# Patient Record
Sex: FEMALE | Race: WHITE | NOT HISPANIC OR LATINO | Employment: UNEMPLOYED | URBAN - METROPOLITAN AREA
[De-identification: names, ages, dates, MRNs, and addresses within clinical notes are randomized per-mention and may not be internally consistent; named-entity substitution may affect disease eponyms.]

---

## 2019-12-13 ENCOUNTER — CLINICAL SUPPORT (OUTPATIENT)
Dept: CARDIAC REHAB | Facility: HOSPITAL | Age: 44
End: 2019-12-13
Attending: SPECIALIST
Payer: COMMERCIAL

## 2019-12-13 DIAGNOSIS — Z95.5 S/P CORONARY ARTERY STENT PLACEMENT: ICD-10-CM

## 2019-12-13 PROCEDURE — 93798 PHYS/QHP OP CAR RHAB W/ECG: CPT

## 2019-12-16 ENCOUNTER — CLINICAL SUPPORT (OUTPATIENT)
Dept: CARDIAC REHAB | Facility: HOSPITAL | Age: 44
End: 2019-12-16
Attending: SPECIALIST
Payer: COMMERCIAL

## 2019-12-16 DIAGNOSIS — Z95.5 S/P CORONARY ARTERY STENT PLACEMENT: ICD-10-CM

## 2019-12-16 PROCEDURE — 93798 PHYS/QHP OP CAR RHAB W/ECG: CPT

## 2019-12-18 ENCOUNTER — CLINICAL SUPPORT (OUTPATIENT)
Dept: CARDIAC REHAB | Facility: HOSPITAL | Age: 44
End: 2019-12-18
Attending: SPECIALIST
Payer: COMMERCIAL

## 2019-12-18 DIAGNOSIS — Z95.5 S/P CORONARY ARTERY STENT PLACEMENT: ICD-10-CM

## 2019-12-18 PROCEDURE — 93798 PHYS/QHP OP CAR RHAB W/ECG: CPT

## 2019-12-20 ENCOUNTER — CLINICAL SUPPORT (OUTPATIENT)
Dept: CARDIAC REHAB | Facility: HOSPITAL | Age: 44
End: 2019-12-20
Attending: SPECIALIST
Payer: COMMERCIAL

## 2019-12-20 DIAGNOSIS — Z95.5 S/P CORONARY ARTERY STENT PLACEMENT: ICD-10-CM

## 2019-12-20 PROCEDURE — 93798 PHYS/QHP OP CAR RHAB W/ECG: CPT

## 2019-12-30 ENCOUNTER — CLINICAL SUPPORT (OUTPATIENT)
Dept: CARDIAC REHAB | Facility: HOSPITAL | Age: 44
End: 2019-12-30
Attending: SPECIALIST
Payer: COMMERCIAL

## 2019-12-30 DIAGNOSIS — Z95.5 S/P CORONARY ARTERY STENT PLACEMENT: ICD-10-CM

## 2019-12-30 PROCEDURE — 93798 PHYS/QHP OP CAR RHAB W/ECG: CPT

## 2020-10-14 NOTE — PROGRESS NOTES
[facility] Note    PCP: Primary Doctor No    Chief Complaint: urinary incontinence     SUBJECTIVE:       History of Present Illness:  Julieth Lozano is a 45 y.o. female who presents to clinic for urinary incontinence. She is New  to our clinic.     Complaining of severe urinary incontinence. This is associated with urges as well as activity. States she will sit on the toilet for a while to ensure everything comes out however will then have to void shortly after or leaks immediately after. Wears 7 pads per day.   No hematuria or dysuria.   3 vaginal deliveries. Has constipation, bowel movement 3 times a week.   Drinks mostly water and coke.   No UTIs or hx of stones.     UA today: negative for blood, leuks, nitrite   PVR today: 30 cc    Last urine culture: no documented UTIs    Family  hx: no  malignancy   CAD s/p stent placement of Nov 2019, HTN, HLD    Past medical, family, and social history reviewed as documented in chart with pertinent positive medical, family, and social history detailed in HPI.    Review of patient's allergies indicates:   Allergen Reactions    Effexor [venlafaxine]        Past Medical History:   Diagnosis Date    Acid reflux     Coronary artery disease     Hyperlipemia     Hypertension      Past Surgical History:   Procedure Laterality Date    CERVICAL DISC SURGERY      CHOLECYSTECTOMY      CORONARY ANGIOPLASTY WITH STENT PLACEMENT      DILATION AND CURETTAGE OF UTERUS      THORACIC DISC SURGERY       No family history on file.  Social History     Tobacco Use    Smoking status: Former Smoker   Substance Use Topics    Alcohol use: Not on file    Drug use: Not on file        Review of Systems   Constitutional: Negative for chills and fever.   HENT: Negative for trouble swallowing.    Eyes: Negative for pain.   Respiratory: Negative for cough and shortness of breath.    Gastrointestinal: Positive for constipation. Negative for diarrhea, nausea and vomiting.  "  Genitourinary: Positive for frequency and urgency. Negative for difficulty urinating, hematuria and vaginal pain.   Musculoskeletal: Negative for back pain.   Skin: Negative for rash.   Neurological: Negative for weakness.   Psychiatric/Behavioral: Negative for behavioral problems.       OBJECTIVE:     Anticoagulation:  Aspirin and Plavix    Estimated body mass index is 35.16 kg/m² as calculated from the following:    Height as of this encounter: 5' 9" (1.753 m).    Weight as of this encounter: 108 kg (238 lb 1.6 oz).    Vital Signs (Most Recent)  Pulse: 63 (10/15/20 1011)  BP: 134/81 (10/15/20 1011)    Physical Exam   Vitals reviewed.  Constitutional: She is oriented to person, place, and time. She does not appear ill. No distress.   HENT:   Head: Normocephalic and atraumatic.   Eyes: No scleral icterus.   Cardiovascular: Normal rate and regular rhythm.    Pulmonary/Chest: Effort normal. No respiratory distress.   Abdominal: Soft. Normal appearance.   Genitourinary:    Pelvic exam was performed with patient in the lithotomy position.   There is no rash on the right labia. There is no rash on the left labia.    Genitourinary Comments: No significant vaginal prolapse   No OFELIA on exam     Neurological: She is alert and oriented to person, place, and time.   Psychiatric: Her behavior is normal. Mood normal.       Imaging:  No pertinent recent imaging available.    ASSESSMENT     1. Urinary incontinence, unspecified type        PLAN:     - We discussed that her symptoms have components of both stress and urge incontinence and we need to get a better idea of which is more prominent and bothersome  - 3 day voiding diary given  - I recommended that the most important modifiable factor is getting her constipation under control and having at least 1 bowel movement a day  - She recently had a cardiac stent placed and is on Plavix, seeing her cardiologist next month for this  - For this reason we do not want to proceed with " anything invasive at this time  - I have given her a trial of Myrbetriq 25 mg to start after she completes the voiding diary. Risks and benefits discussed and informed her the medication can take up to 6 weeks to work fully  - Will see her back in 3 months. Depending on her symptoms and the status of her cardiac issues we can consider further workup including UDS    Nimo Silvestre MD     Letter to Karthikeyan Biswas MD

## 2020-10-15 ENCOUNTER — OFFICE VISIT (OUTPATIENT)
Dept: UROLOGY | Facility: CLINIC | Age: 45
End: 2020-10-15
Payer: COMMERCIAL

## 2020-10-15 VITALS
HEIGHT: 69 IN | BODY MASS INDEX: 35.27 KG/M2 | WEIGHT: 238.13 LBS | SYSTOLIC BLOOD PRESSURE: 134 MMHG | HEART RATE: 63 BPM | DIASTOLIC BLOOD PRESSURE: 81 MMHG

## 2020-10-15 DIAGNOSIS — R32 URINARY INCONTINENCE, UNSPECIFIED TYPE: Primary | ICD-10-CM

## 2020-10-15 LAB
BILIRUB SERPL-MCNC: NORMAL MG/DL
BLOOD URINE, POC: NORMAL
CLARITY, POC UA: CLEAR
COLOR, POC UA: YELLOW
GLUCOSE UR QL STRIP: NORMAL
KETONES UR QL STRIP: NORMAL
LEUKOCYTE ESTERASE URINE, POC: NORMAL
NITRITE, POC UA: NORMAL
PH, POC UA: 6
PROTEIN, POC: NORMAL
SPECIFIC GRAVITY, POC UA: 1.02
UROBILINOGEN, POC UA: 0.2

## 2020-10-15 PROCEDURE — 99999 PR PBB SHADOW E&M-NEW PATIENT-LVL III: ICD-10-PCS | Mod: PBBFAC,,, | Performed by: STUDENT IN AN ORGANIZED HEALTH CARE EDUCATION/TRAINING PROGRAM

## 2020-10-15 PROCEDURE — 81002 POCT URINE DIPSTICK WITHOUT MICROSCOPE: ICD-10-PCS | Mod: S$GLB,,, | Performed by: STUDENT IN AN ORGANIZED HEALTH CARE EDUCATION/TRAINING PROGRAM

## 2020-10-15 PROCEDURE — 51701 PR INSERTION OF NON-INDWELLING BLADDER CATHETERIZATION FOR RESIDUAL UR: ICD-10-PCS | Mod: S$GLB,,, | Performed by: STUDENT IN AN ORGANIZED HEALTH CARE EDUCATION/TRAINING PROGRAM

## 2020-10-15 PROCEDURE — 3008F PR BODY MASS INDEX (BMI) DOCUMENTED: ICD-10-PCS | Mod: CPTII,S$GLB,, | Performed by: STUDENT IN AN ORGANIZED HEALTH CARE EDUCATION/TRAINING PROGRAM

## 2020-10-15 PROCEDURE — 51701 INSERT BLADDER CATHETER: CPT | Mod: S$GLB,,, | Performed by: STUDENT IN AN ORGANIZED HEALTH CARE EDUCATION/TRAINING PROGRAM

## 2020-10-15 PROCEDURE — 99204 PR OFFICE/OUTPT VISIT, NEW, LEVL IV, 45-59 MIN: ICD-10-PCS | Mod: 25,S$GLB,, | Performed by: STUDENT IN AN ORGANIZED HEALTH CARE EDUCATION/TRAINING PROGRAM

## 2020-10-15 PROCEDURE — 99204 OFFICE O/P NEW MOD 45 MIN: CPT | Mod: 25,S$GLB,, | Performed by: STUDENT IN AN ORGANIZED HEALTH CARE EDUCATION/TRAINING PROGRAM

## 2020-10-15 PROCEDURE — 81002 URINALYSIS NONAUTO W/O SCOPE: CPT | Mod: S$GLB,,, | Performed by: STUDENT IN AN ORGANIZED HEALTH CARE EDUCATION/TRAINING PROGRAM

## 2020-10-15 PROCEDURE — 99999 PR PBB SHADOW E&M-NEW PATIENT-LVL III: CPT | Mod: PBBFAC,,, | Performed by: STUDENT IN AN ORGANIZED HEALTH CARE EDUCATION/TRAINING PROGRAM

## 2020-10-15 PROCEDURE — 3008F BODY MASS INDEX DOCD: CPT | Mod: CPTII,S$GLB,, | Performed by: STUDENT IN AN ORGANIZED HEALTH CARE EDUCATION/TRAINING PROGRAM

## 2020-10-15 RX ORDER — IRBESARTAN 150 MG/1
TABLET ORAL
COMMUNITY
Start: 2020-10-06

## 2020-10-15 RX ORDER — PANTOPRAZOLE SODIUM 40 MG/1
TABLET, DELAYED RELEASE ORAL
COMMUNITY
Start: 2020-10-06

## 2020-10-15 RX ORDER — MECLIZINE HCL 12.5 MG 12.5 MG/1
TABLET ORAL
COMMUNITY
Start: 2020-10-13

## 2020-10-15 RX ORDER — ASPIRIN 81 MG/1
81 TABLET ORAL DAILY
COMMUNITY

## 2020-10-15 RX ORDER — CLOPIDOGREL BISULFATE 75 MG/1
TABLET ORAL
COMMUNITY
Start: 2020-09-22

## 2020-10-15 RX ORDER — CITALOPRAM 20 MG/1
TABLET, FILM COATED ORAL
COMMUNITY
Start: 2020-10-13 | End: 2024-01-17 | Stop reason: CLARIF

## 2020-10-15 RX ORDER — MIRABEGRON 25 MG/1
25 TABLET, FILM COATED, EXTENDED RELEASE ORAL DAILY
Qty: 30 TABLET | Refills: 11 | Status: SHIPPED | OUTPATIENT
Start: 2020-10-15 | End: 2024-01-17 | Stop reason: CLARIF

## 2020-10-15 RX ORDER — PRAVASTATIN SODIUM 80 MG/1
80 TABLET ORAL DAILY
COMMUNITY
Start: 2020-10-06 | End: 2024-01-17 | Stop reason: CLARIF

## 2020-11-04 DIAGNOSIS — G47.33 OSA (OBSTRUCTIVE SLEEP APNEA): ICD-10-CM

## 2020-11-04 DIAGNOSIS — G47.9 FATIGUE DUE TO SLEEP PATTERN DISTURBANCE: ICD-10-CM

## 2020-11-04 DIAGNOSIS — R06.83 SNORING: ICD-10-CM

## 2020-11-04 DIAGNOSIS — R53.83 FATIGUE DUE TO SLEEP PATTERN DISTURBANCE: ICD-10-CM

## 2020-11-04 DIAGNOSIS — Z01.818 OTHER SPECIFIED PRE-OPERATIVE EXAMINATION: Primary | ICD-10-CM

## 2020-11-04 DIAGNOSIS — G47.19 EXCESSIVE DAYTIME SLEEPINESS: ICD-10-CM

## 2020-11-09 ENCOUNTER — HOSPITAL ENCOUNTER (OUTPATIENT)
Dept: PREADMISSION TESTING | Facility: HOSPITAL | Age: 45
Discharge: HOME | End: 2020-11-09
Attending: INTERNAL MEDICINE
Payer: COMMERCIAL

## 2020-11-09 DIAGNOSIS — Z01.818 OTHER SPECIFIED PRE-OPERATIVE EXAMINATION: ICD-10-CM

## 2020-11-09 LAB — SARS-COV-2 RNA RESP QL NAA+PROBE: NOT DETECTED

## 2020-11-09 PROCEDURE — U0003 INFECTIOUS AGENT DETECTION BY NUCLEIC ACID (DNA OR RNA); SEVERE ACUTE RESPIRATORY SYNDROME CORONAVIRUS 2 (SARS-COV-2) (CORONAVIRUS DISEASE [COVID-19]), AMPLIFIED PROBE TECHNIQUE, MAKING USE OF HIGH THROUGHPUT TECHNOLOGIES AS DESCRIBED BY CMS-2020-01-R: HCPCS

## 2020-11-12 ENCOUNTER — PROCEDURE VISIT (OUTPATIENT)
Dept: SLEEP MEDICINE | Facility: HOSPITAL | Age: 45
End: 2020-11-12
Attending: INTERNAL MEDICINE
Payer: COMMERCIAL

## 2020-11-12 DIAGNOSIS — G47.19 EXCESSIVE DAYTIME SLEEPINESS: ICD-10-CM

## 2020-11-12 DIAGNOSIS — R06.83 SNORING: ICD-10-CM

## 2020-11-12 DIAGNOSIS — G47.33 OSA (OBSTRUCTIVE SLEEP APNEA): ICD-10-CM

## 2020-11-12 DIAGNOSIS — G47.9 FATIGUE DUE TO SLEEP PATTERN DISTURBANCE: ICD-10-CM

## 2020-11-12 DIAGNOSIS — R53.83 FATIGUE DUE TO SLEEP PATTERN DISTURBANCE: ICD-10-CM

## 2020-11-12 PROCEDURE — 95810 POLYSOM 6/> YRS 4/> PARAM: CPT

## 2020-11-18 DIAGNOSIS — G47.33 OSA (OBSTRUCTIVE SLEEP APNEA): ICD-10-CM

## 2020-11-18 DIAGNOSIS — G47.19 EXCESSIVE DAYTIME SLEEPINESS: ICD-10-CM

## 2020-11-18 DIAGNOSIS — R06.83 SNORING: ICD-10-CM

## 2020-11-18 DIAGNOSIS — Z01.818 OTHER SPECIFIED PRE-OPERATIVE EXAMINATION: Primary | ICD-10-CM

## 2020-11-18 DIAGNOSIS — G47.9 FATIGUE DUE TO SLEEP PATTERN DISTURBANCE: ICD-10-CM

## 2020-11-18 DIAGNOSIS — R53.83 FATIGUE DUE TO SLEEP PATTERN DISTURBANCE: ICD-10-CM

## 2020-11-21 ENCOUNTER — LAB VISIT (OUTPATIENT)
Dept: PRIMARY CARE CLINIC | Facility: CLINIC | Age: 45
End: 2020-11-21
Payer: COMMERCIAL

## 2020-11-21 DIAGNOSIS — Z01.818 OTHER SPECIFIED PRE-OPERATIVE EXAMINATION: ICD-10-CM

## 2020-11-21 PROCEDURE — U0003 INFECTIOUS AGENT DETECTION BY NUCLEIC ACID (DNA OR RNA); SEVERE ACUTE RESPIRATORY SYNDROME CORONAVIRUS 2 (SARS-COV-2) (CORONAVIRUS DISEASE [COVID-19]), AMPLIFIED PROBE TECHNIQUE, MAKING USE OF HIGH THROUGHPUT TECHNOLOGIES AS DESCRIBED BY CMS-2020-01-R: HCPCS

## 2020-11-22 LAB — SARS-COV-2 RNA RESP QL NAA+PROBE: NOT DETECTED

## 2020-11-24 ENCOUNTER — PROCEDURE VISIT (OUTPATIENT)
Dept: SLEEP MEDICINE | Facility: HOSPITAL | Age: 45
End: 2020-11-24
Attending: INTERNAL MEDICINE
Payer: COMMERCIAL

## 2020-11-24 DIAGNOSIS — G47.9 FATIGUE DUE TO SLEEP PATTERN DISTURBANCE: ICD-10-CM

## 2020-11-24 DIAGNOSIS — R53.83 FATIGUE DUE TO SLEEP PATTERN DISTURBANCE: ICD-10-CM

## 2020-11-24 DIAGNOSIS — R06.83 SNORING: ICD-10-CM

## 2020-11-24 DIAGNOSIS — G47.19 EXCESSIVE DAYTIME SLEEPINESS: ICD-10-CM

## 2020-11-24 DIAGNOSIS — G47.33 OSA (OBSTRUCTIVE SLEEP APNEA): ICD-10-CM

## 2020-11-24 PROCEDURE — 95811 POLYSOM 6/>YRS CPAP 4/> PARM: CPT

## 2023-01-20 ENCOUNTER — OFFICE VISIT (OUTPATIENT)
Dept: ORTHOPEDICS | Facility: CLINIC | Age: 48
End: 2023-01-20
Payer: COMMERCIAL

## 2023-01-20 VITALS — BODY MASS INDEX: 35.25 KG/M2 | HEIGHT: 69 IN | WEIGHT: 238 LBS

## 2023-01-20 DIAGNOSIS — M17.11 PRIMARY OSTEOARTHRITIS OF RIGHT KNEE: Primary | ICD-10-CM

## 2023-01-20 DIAGNOSIS — M22.41 CHONDROMALACIA, PATELLA, RIGHT: ICD-10-CM

## 2023-01-20 PROCEDURE — 99203 OFFICE O/P NEW LOW 30 MIN: CPT | Mod: 25,S$GLB,, | Performed by: PHYSICIAN ASSISTANT

## 2023-01-20 PROCEDURE — 1159F MED LIST DOCD IN RCRD: CPT | Mod: CPTII,S$GLB,, | Performed by: PHYSICIAN ASSISTANT

## 2023-01-20 PROCEDURE — 3008F BODY MASS INDEX DOCD: CPT | Mod: CPTII,S$GLB,, | Performed by: PHYSICIAN ASSISTANT

## 2023-01-20 PROCEDURE — 1160F RVW MEDS BY RX/DR IN RCRD: CPT | Mod: CPTII,S$GLB,, | Performed by: PHYSICIAN ASSISTANT

## 2023-01-20 PROCEDURE — 1159F PR MEDICATION LIST DOCUMENTED IN MEDICAL RECORD: ICD-10-PCS | Mod: CPTII,S$GLB,, | Performed by: PHYSICIAN ASSISTANT

## 2023-01-20 PROCEDURE — 1160F PR REVIEW ALL MEDS BY PRESCRIBER/CLIN PHARMACIST DOCUMENTED: ICD-10-PCS | Mod: CPTII,S$GLB,, | Performed by: PHYSICIAN ASSISTANT

## 2023-01-20 PROCEDURE — 20610 LARGE JOINT ASPIRATION/INJECTION: R KNEE: ICD-10-PCS | Mod: RT,S$GLB,, | Performed by: PHYSICIAN ASSISTANT

## 2023-01-20 PROCEDURE — 99203 PR OFFICE/OUTPT VISIT, NEW, LEVL III, 30-44 MIN: ICD-10-PCS | Mod: 25,S$GLB,, | Performed by: PHYSICIAN ASSISTANT

## 2023-01-20 PROCEDURE — 3008F PR BODY MASS INDEX (BMI) DOCUMENTED: ICD-10-PCS | Mod: CPTII,S$GLB,, | Performed by: PHYSICIAN ASSISTANT

## 2023-01-20 PROCEDURE — 20610 DRAIN/INJ JOINT/BURSA W/O US: CPT | Mod: RT,S$GLB,, | Performed by: PHYSICIAN ASSISTANT

## 2023-01-20 RX ORDER — MELOXICAM 15 MG/1
TABLET ORAL
COMMUNITY
End: 2024-01-17 | Stop reason: CLARIF

## 2023-01-20 RX ORDER — TIRZEPATIDE 2.5 MG/.5ML
INJECTION, SOLUTION SUBCUTANEOUS
COMMUNITY
Start: 2023-01-06 | End: 2024-01-17

## 2023-01-20 RX ORDER — SERTRALINE HYDROCHLORIDE 100 MG/1
100 TABLET, FILM COATED ORAL EVERY MORNING
COMMUNITY
Start: 2022-11-03

## 2023-01-20 RX ORDER — ROSUVASTATIN CALCIUM 10 MG/1
10 TABLET, COATED ORAL EVERY MORNING
COMMUNITY
Start: 2022-12-12

## 2023-01-20 RX ORDER — TRIAMCINOLONE ACETONIDE 40 MG/ML
40 INJECTION, SUSPENSION INTRA-ARTICULAR; INTRAMUSCULAR
Status: DISCONTINUED | OUTPATIENT
Start: 2023-01-20 | End: 2023-01-20 | Stop reason: HOSPADM

## 2023-01-20 RX ADMIN — TRIAMCINOLONE ACETONIDE 40 MG: 40 INJECTION, SUSPENSION INTRA-ARTICULAR; INTRAMUSCULAR at 08:01

## 2023-01-20 NOTE — PROGRESS NOTES
[facility]  [address]  [place] [address]  Phone: [phone]   [phone]    Patient's PCP: Primary Doctor No  Referring Provider: No ref. provider found    Subjective:      Chief Complaint:   Chief Complaint   Patient presents with    Right Knee - Pain, Swelling     Right knee pain that has been going on for about two months. States that her knee is grinding and feels like there is fluid in it. States that she can be walking and she will hear a loud pop and cause her to almost fall       Past Medical History:   Diagnosis Date    Acid reflux     Coronary artery disease     Hyperlipemia     Hypertension        Past Surgical History:   Procedure Laterality Date    CERVICAL DISC SURGERY      CHOLECYSTECTOMY      CORONARY ANGIOPLASTY WITH STENT PLACEMENT      DILATION AND CURETTAGE OF UTERUS      THORACIC DISC SURGERY         Current Outpatient Medications   Medication Sig    aspirin (ECOTRIN) 81 MG EC tablet Take 81 mg by mouth once daily.    clopidogreL (PLAVIX) 75 mg tablet TAKE 1 TABLET BY MOUTH ONCE DAILY FOR 90 DAYS    irbesartan (AVAPRO) 150 MG tablet TAKE 1 TABLET BY MOUTH ONCE DAILY FOR 90 DAYS    meclizine (ANTIVERT) 12.5 mg tablet TK 1 T PO BID PRN    pantoprazole (PROTONIX) 40 MG tablet TAKE 1 TABLET BY MOUTH ONCE DAILY FOR 90 DAYS    pravastatin (PRAVACHOL) 80 MG tablet Take 80 mg by mouth once daily.    citalopram (CELEXA) 20 MG tablet TK 2 TS PO QD IN THE MORNING    meloxicam (MOBIC) 15 MG tablet meloxicam 15 mg tablet   TAKE 1 TABLET BY MOUTH EVERY DAY    mirabegron (MYRBETRIQ) 25 mg Tb24 ER tablet Take 1 tablet (25 mg total) by mouth once daily.    MOUNJARO 2.5 mg/0.5 mL PnIj Inject into the skin.    rosuvastatin (CRESTOR) 10 MG tablet Take 10 mg by mouth every morning.    sertraline (ZOLOFT) 100 MG tablet Take 100 mg by mouth every morning.     No current facility-administered medications for this visit.       Review of patient's allergies indicates:   Allergen  Reactions    Effexor [venlafaxine]        History reviewed. No pertinent family history.    Social History     Socioeconomic History    Marital status:    Tobacco Use    Smoking status: Former       History of present illness:  Julieth presents to the clinic today as a new patient with a chief complaint of right knee pain that has been going on for several months.  She denies any injury or trauma to the knee.  She has not had any formal evaluation or treatment.  She is on Mobic anti-inflammatory for other ailments.    Review of Systems:    Constitutional: Negative for chills, fever and weight loss.   HENT: Negative for congestion.    Eyes: Negative for discharge and redness.   Respiratory: Negative for cough and shortness of breath.    Cardiovascular: Negative for chest pain.   Gastrointestinal: Negative for nausea and vomiting.   Musculoskeletal: See HPI.   Skin: Negative for rash.   Neurological: Negative for headaches.   Endo/Heme/Allergies: Does not bruise/bleed easily.   Psychiatric/Behavioral: The patient is not nervous/anxious.    All other systems reviewed and are negative.       Objective:      Physical Examination:    Vital Signs:  There were no vitals filed for this visit.    Body mass index is 35.15 kg/m².    This a well-developed, well nourished patient in no acute distress.  They are alert and oriented and cooperative to examination.     Right knee exam:  Skin to her right knee is clean dry and intact.  There is no erythema or ecchymosis.  There are no signs or symptoms of infection.  Patient is neurovascularly intact throughout the right lower extremity.  Right calf is soft and nontender.  Right knee active range of motion is well preserved 0-130 degrees.  Right knee is stable to varus and valgus stresses while held in extension.  She does have some medial joint line tenderness anteriorly.  She is tender to palpation about the medial patella facet.  She does have a positive patellar grind.  She  has patellofemoral crepitus with range of motioning.  She is a negative straight leg raise on the right.  She can weightbear as tolerated on her right lower extremity and has a nonantalgic gait.    Pertinent New Results:        XRAY Report / Interpretation:   Three views were taken of the right knee today: AP, lateral, and sunrise views.  They reveal no acute fractures or dislocations.  Visualized soft tissues appear unremarkable.  She does have moderate degenerative change in the medial and patellofemoral compartments of the right knee.      Assessment:       1. Primary osteoarthritis of right knee    2. Chondromalacia, patella, right      Plan:     Primary osteoarthritis of right knee  -     X-Ray Knee 3 View Right  -     Large Joint Aspiration/Injection: R knee    Chondromalacia, patella, right        Follow up in about 3 months (around 4/20/2023) for RT knee inj f/u.    I injected her right knee today via an anterior lateral approach with 40 mg of Kenalog and lidocaine.  She tolerated this well.  I do believe her symptoms are patellofemoral and medial compartment in nature.  She may have a degenerative medial meniscus tear.  We will see her back in 3 months to see how she responds to this injection and continued Mobic use.        Duncan Adrian, JEANNES, PA-C    This note was created using [employer] voice recognition software that occasionally misinterprets words or phrases.

## 2023-01-20 NOTE — PROCEDURES
Large Joint Aspiration/Injection: R knee    Date/Time: 1/20/2023 8:00 AM  Performed by: Duncan Adrian PA-C  Authorized by: Duncan Adrian PA-C     Consent Done?:  Yes (Verbal)  Indications:  Pain and arthritis  Site marked: the procedure site was marked    Timeout: prior to procedure the correct patient, procedure, and site was verified    Prep: patient was prepped and draped in usual sterile fashion      Local anesthesia used?: Yes    Local anesthetic:  Lidocaine 1% without epinephrine  Ultrasonic Guidance for needle placement?: No    Location:  Knee  Site:  R knee  Medications:  40 mg triamcinolone acetonide 40 mg/mL  Patient tolerance:  Patient tolerated the procedure well with no immediate complications

## 2023-09-29 ENCOUNTER — OFFICE VISIT (OUTPATIENT)
Dept: ORTHOPEDICS | Facility: CLINIC | Age: 48
End: 2023-09-29
Payer: COMMERCIAL

## 2023-09-29 VITALS — BODY MASS INDEX: 35.25 KG/M2 | HEIGHT: 69 IN | WEIGHT: 238 LBS

## 2023-09-29 DIAGNOSIS — M22.41 CHONDROMALACIA, PATELLA, RIGHT: ICD-10-CM

## 2023-09-29 DIAGNOSIS — M17.11 PRIMARY OSTEOARTHRITIS OF RIGHT KNEE: Primary | ICD-10-CM

## 2023-09-29 PROCEDURE — 99213 PR OFFICE/OUTPT VISIT, EST, LEVL III, 20-29 MIN: ICD-10-PCS | Mod: 25,S$GLB,, | Performed by: PHYSICIAN ASSISTANT

## 2023-09-29 PROCEDURE — 99213 OFFICE O/P EST LOW 20 MIN: CPT | Mod: 25,S$GLB,, | Performed by: PHYSICIAN ASSISTANT

## 2023-09-29 PROCEDURE — 1160F RVW MEDS BY RX/DR IN RCRD: CPT | Mod: CPTII,S$GLB,, | Performed by: PHYSICIAN ASSISTANT

## 2023-09-29 PROCEDURE — 4010F ACE/ARB THERAPY RXD/TAKEN: CPT | Mod: CPTII,S$GLB,, | Performed by: PHYSICIAN ASSISTANT

## 2023-09-29 PROCEDURE — 1160F PR REVIEW ALL MEDS BY PRESCRIBER/CLIN PHARMACIST DOCUMENTED: ICD-10-PCS | Mod: CPTII,S$GLB,, | Performed by: PHYSICIAN ASSISTANT

## 2023-09-29 PROCEDURE — 1159F MED LIST DOCD IN RCRD: CPT | Mod: CPTII,S$GLB,, | Performed by: PHYSICIAN ASSISTANT

## 2023-09-29 PROCEDURE — 20610 DRAIN/INJ JOINT/BURSA W/O US: CPT | Mod: RT,S$GLB,, | Performed by: PHYSICIAN ASSISTANT

## 2023-09-29 PROCEDURE — 3008F BODY MASS INDEX DOCD: CPT | Mod: CPTII,S$GLB,, | Performed by: PHYSICIAN ASSISTANT

## 2023-09-29 PROCEDURE — 20610 LARGE JOINT ASPIRATION/INJECTION: R KNEE: ICD-10-PCS | Mod: RT,S$GLB,, | Performed by: PHYSICIAN ASSISTANT

## 2023-09-29 PROCEDURE — 3008F PR BODY MASS INDEX (BMI) DOCUMENTED: ICD-10-PCS | Mod: CPTII,S$GLB,, | Performed by: PHYSICIAN ASSISTANT

## 2023-09-29 PROCEDURE — 4010F PR ACE/ARB THEARPY RXD/TAKEN: ICD-10-PCS | Mod: CPTII,S$GLB,, | Performed by: PHYSICIAN ASSISTANT

## 2023-09-29 PROCEDURE — 1159F PR MEDICATION LIST DOCUMENTED IN MEDICAL RECORD: ICD-10-PCS | Mod: CPTII,S$GLB,, | Performed by: PHYSICIAN ASSISTANT

## 2023-09-29 RX ORDER — TRIAMCINOLONE ACETONIDE 40 MG/ML
40 INJECTION, SUSPENSION INTRA-ARTICULAR; INTRAMUSCULAR
Status: DISCONTINUED | OUTPATIENT
Start: 2023-09-29 | End: 2023-09-29 | Stop reason: HOSPADM

## 2023-09-29 RX ADMIN — TRIAMCINOLONE ACETONIDE 40 MG: 40 INJECTION, SUSPENSION INTRA-ARTICULAR; INTRAMUSCULAR at 08:09

## 2023-09-29 NOTE — PROGRESS NOTES
[facility]  [address]  [place] [address]  Phone: [phone]   [phone]    Patient's PCP: Karthikeyan Biswas NP  Referring Provider: No ref. provider found    Subjective:      Chief Complaint:   Chief Complaint   Patient presents with    Right Knee - Pain     Patient is here for a f/up on Right knee injection on 1.20.23, states her knee pain keeps her up at night, would like another injection today.        Past Medical History:   Diagnosis Date    Acid reflux     Coronary artery disease     Hyperlipemia     Hypertension        Past Surgical History:   Procedure Laterality Date    CERVICAL DISC SURGERY      CHOLECYSTECTOMY      CORONARY ANGIOPLASTY WITH STENT PLACEMENT      DILATION AND CURETTAGE OF UTERUS      THORACIC DISC SURGERY         Current Outpatient Medications   Medication Sig    aspirin (ECOTRIN) 81 MG EC tablet Take 81 mg by mouth once daily.    clopidogreL (PLAVIX) 75 mg tablet TAKE 1 TABLET BY MOUTH ONCE DAILY FOR 90 DAYS    irbesartan (AVAPRO) 150 MG tablet TAKE 1 TABLET BY MOUTH ONCE DAILY FOR 90 DAYS    meclizine (ANTIVERT) 12.5 mg tablet TK 1 T PO BID PRN    pantoprazole (PROTONIX) 40 MG tablet TAKE 1 TABLET BY MOUTH ONCE DAILY FOR 90 DAYS    rosuvastatin (CRESTOR) 10 MG tablet Take 10 mg by mouth every morning.    sertraline (ZOLOFT) 100 MG tablet Take 100 mg by mouth every morning.    citalopram (CELEXA) 20 MG tablet TK 2 TS PO QD IN THE MORNING    meloxicam (MOBIC) 15 MG tablet meloxicam 15 mg tablet   TAKE 1 TABLET BY MOUTH EVERY DAY    mirabegron (MYRBETRIQ) 25 mg Tb24 ER tablet Take 1 tablet (25 mg total) by mouth once daily. (Patient not taking: Reported on 9/29/2023)    MOUNJARO 2.5 mg/0.5 mL PnIj Inject into the skin.    pravastatin (PRAVACHOL) 80 MG tablet Take 80 mg by mouth once daily.     No current facility-administered medications for this visit.       Review of patient's allergies indicates:   Allergen Reactions    Effexor [venlafaxine]         History reviewed. No pertinent family history.    Social History     Socioeconomic History    Marital status:    Tobacco Use    Smoking status: Former       History of present illness:  Julieth comes in today for follow-up for her right knee.  She was last seen and injected in the knee about 8 months ago.  She would good relief of her symptoms at that time.  She still has intermittent discomfort from time to time.  She has on a rare occasion feelings of instability in the knee wanting to buckle give out.  It never actually has.    Review of Systems:    Constitutional: Negative for chills, fever and weight loss.   HENT: Negative for congestion.    Eyes: Negative for discharge and redness.   Respiratory: Negative for cough and shortness of breath.    Cardiovascular: Negative for chest pain.   Gastrointestinal: Negative for nausea and vomiting.   Musculoskeletal: See HPI.   Skin: Negative for rash.   Neurological: Negative for headaches.   Endo/Heme/Allergies: Does not bruise/bleed easily.   Psychiatric/Behavioral: The patient is not nervous/anxious.    All other systems reviewed and are negative.       Objective:      Physical Examination:    Vital Signs:  There were no vitals filed for this visit.    Body mass index is 35.15 kg/m².    This a well-developed, well nourished patient in no acute distress.  They are alert and oriented and cooperative to examination.     Right knee exam: Skin to the right knee is clean dry and intact.  There is no erythema or ecchymosis.  There are no signs or symptoms of infection.  Patient is neurovascularly intact throughout the right lower extremity.  Right calf is soft and nontender.  Right knee range of motion 0-110 degrees.  The knee is stable to varus and valgus stresses.  She is nontender medially.  She does have some lateral joint line tenderness anteriorly.  She can weightbear as tolerated on the right lower extremity.  She has a mildly antalgic gait.    Pertinent  New Results:        XRAY Report / Interpretation:   Three views taken of the right knee today: AP, lateral, and sunrise views.  They reveal no acute fractures or dislocations.  Visualized soft tissues appear unremarkable.  She has mild-moderate arthrosis tricompartmentally in the right knee.      Assessment:       1. Primary osteoarthritis of right knee    2. Chondromalacia, patella, right      Plan:     Primary osteoarthritis of right knee  -     X-Ray Knee 3 View Right  -     Large Joint Aspiration/Injection: R knee    Chondromalacia, patella, right  -     X-Ray Knee 3 View Right        Follow up in about 3 months (around 12/29/2023) for Inj f/up Right knee 9.29.23 .    I would a discussion with the patient today about treatment options.  We will repeat an injection today via the anterior lateral aspect of the right knee with 40 mg of Kenalog and lidocaine.  If she has any recurrence of symptoms or continued with any feelings of instability in the knee like it wants to buckle or give out, I would like her to follow-up for likely advanced imaging of an MRI to evaluate for a lateral meniscus tear.  We will see her back in 3 months if she has any recurrence or continuation of symptoms.  If she is doing quite well, she can simply cancel follow-up on an as-needed basis.        Duncan Adrian, JEANNES, PA-C    This note was created using [employer] voice recognition software that occasionally misinterprets words or phrases.

## 2023-09-29 NOTE — PROCEDURES
Large Joint Aspiration/Injection: R knee    Date/Time: 9/29/2023 8:45 AM    Performed by: Duncan Adrian PA-C  Authorized by: Duncan Adrian PA-C    Consent Done?:  Yes (Verbal)  Indications:  Arthritis and pain  Site marked: the procedure site was marked    Timeout: prior to procedure the correct patient, procedure, and site was verified    Prep: patient was prepped and draped in usual sterile fashion      Local anesthesia used?: Yes    Local anesthetic:  Lidocaine 1% without epinephrine    Details:  Needle Size:  25 G  Ultrasonic Guidance for needle placement?: No    Location:  Knee  Site:  R knee  Medications:  40 mg triamcinolone acetonide 40 mg/mL  Patient tolerance:  Patient tolerated the procedure well with no immediate complications

## 2023-12-12 ENCOUNTER — OFFICE VISIT (OUTPATIENT)
Dept: ORTHOPEDICS | Facility: CLINIC | Age: 48
End: 2023-12-12
Payer: COMMERCIAL

## 2023-12-12 VITALS — BODY MASS INDEX: 35.25 KG/M2 | WEIGHT: 238 LBS | HEIGHT: 69 IN

## 2023-12-12 DIAGNOSIS — M17.11 PRIMARY OSTEOARTHRITIS OF RIGHT KNEE: Primary | ICD-10-CM

## 2023-12-12 DIAGNOSIS — M22.41 CHONDROMALACIA, PATELLA, RIGHT: ICD-10-CM

## 2023-12-12 DIAGNOSIS — M23.203 DEGENERATIVE TEAR OF MEDIAL MENISCUS OF RIGHT KNEE: ICD-10-CM

## 2023-12-12 PROCEDURE — 1160F RVW MEDS BY RX/DR IN RCRD: CPT | Mod: CPTII,S$GLB,, | Performed by: ORTHOPAEDIC SURGERY

## 2023-12-12 PROCEDURE — 99213 PR OFFICE/OUTPT VISIT, EST, LEVL III, 20-29 MIN: ICD-10-PCS | Mod: S$GLB,,, | Performed by: ORTHOPAEDIC SURGERY

## 2023-12-12 PROCEDURE — 1159F MED LIST DOCD IN RCRD: CPT | Mod: CPTII,S$GLB,, | Performed by: ORTHOPAEDIC SURGERY

## 2023-12-12 PROCEDURE — 3008F BODY MASS INDEX DOCD: CPT | Mod: CPTII,S$GLB,, | Performed by: ORTHOPAEDIC SURGERY

## 2023-12-12 PROCEDURE — 99213 OFFICE O/P EST LOW 20 MIN: CPT | Mod: S$GLB,,, | Performed by: ORTHOPAEDIC SURGERY

## 2023-12-12 PROCEDURE — 4010F ACE/ARB THERAPY RXD/TAKEN: CPT | Mod: CPTII,S$GLB,, | Performed by: ORTHOPAEDIC SURGERY

## 2023-12-12 PROCEDURE — 4010F PR ACE/ARB THEARPY RXD/TAKEN: ICD-10-PCS | Mod: CPTII,S$GLB,, | Performed by: ORTHOPAEDIC SURGERY

## 2023-12-12 PROCEDURE — 1160F PR REVIEW ALL MEDS BY PRESCRIBER/CLIN PHARMACIST DOCUMENTED: ICD-10-PCS | Mod: CPTII,S$GLB,, | Performed by: ORTHOPAEDIC SURGERY

## 2023-12-12 PROCEDURE — 3008F PR BODY MASS INDEX (BMI) DOCUMENTED: ICD-10-PCS | Mod: CPTII,S$GLB,, | Performed by: ORTHOPAEDIC SURGERY

## 2023-12-12 PROCEDURE — 1159F PR MEDICATION LIST DOCUMENTED IN MEDICAL RECORD: ICD-10-PCS | Mod: CPTII,S$GLB,, | Performed by: ORTHOPAEDIC SURGERY

## 2023-12-12 NOTE — PROGRESS NOTES
[facility]    Subjective:     Chief Complaint:   Chief Complaint   Patient presents with    Right Knee - Pain     Right knee pain follow up. Received inj 09/29/23 which offered relief for about two weeks. States that the pain is getting worse than it was prior to injection. States that the knee has now started to feel like it is going to give out and cause her to fall       Past Medical History:   Diagnosis Date    Acid reflux     Coronary artery disease     Hyperlipemia     Hypertension        Past Surgical History:   Procedure Laterality Date    CERVICAL DISC SURGERY      CHOLECYSTECTOMY      CORONARY ANGIOPLASTY WITH STENT PLACEMENT      DILATION AND CURETTAGE OF UTERUS      THORACIC DISC SURGERY         Current Outpatient Medications   Medication Sig    aspirin (ECOTRIN) 81 MG EC tablet Take 81 mg by mouth once daily.    clopidogreL (PLAVIX) 75 mg tablet TAKE 1 TABLET BY MOUTH ONCE DAILY FOR 90 DAYS    irbesartan (AVAPRO) 150 MG tablet TAKE 1 TABLET BY MOUTH ONCE DAILY FOR 90 DAYS    meclizine (ANTIVERT) 12.5 mg tablet TK 1 T PO BID PRN    pantoprazole (PROTONIX) 40 MG tablet TAKE 1 TABLET BY MOUTH ONCE DAILY FOR 90 DAYS    rosuvastatin (CRESTOR) 10 MG tablet Take 10 mg by mouth every morning.    citalopram (CELEXA) 20 MG tablet TK 2 TS PO QD IN THE MORNING    meloxicam (MOBIC) 15 MG tablet meloxicam 15 mg tablet   TAKE 1 TABLET BY MOUTH EVERY DAY    mirabegron (MYRBETRIQ) 25 mg Tb24 ER tablet Take 1 tablet (25 mg total) by mouth once daily. (Patient not taking: Reported on 9/29/2023)    MOUNJARO 2.5 mg/0.5 mL PnIj Inject into the skin.    pravastatin (PRAVACHOL) 80 MG tablet Take 80 mg by mouth once daily.    sertraline (ZOLOFT) 100 MG tablet Take 100 mg by mouth every morning.     No current facility-administered medications for this visit.       Review of patient's allergies indicates:   Allergen Reactions    Effexor [venlafaxine]        History reviewed. No pertinent family history.    Social  History     Socioeconomic History    Marital status:    Tobacco Use    Smoking status: Former       History of present illness:  48 female returns to clinic today for the right knee.  Over the course of the last 12 months, in January and in September we injected the right knee.  She which is having some generalized knee pain or discomfort.  However over the last 3 months she is developed some feelings of instability.  She states that when she walks now, she feels an audible click or a pop, she does get sharp debilitating pain and instability which makes the knee feel like it wants to give out.      Review of Systems:    Constitution: Negative for chills, fever, and sweats.  Negative for unexplained weight loss.    HENT:  Negative for headaches and blurry vision.    Cardiovascular:Negative for chest pain or irregular heart beat. Negative for hypertension.    Respiratory:  Negative for cough and shortness of breath.    Gastrointestinal: Negative for abdominal pain, heartburn, melena, nausea, and vomitting.    Genitourinary:  Negative bladder incontinence and dysuria.    Musculoskeletal:  See HPI for details.     Neurological: Negative for numbness.    Psychiatric/Behavioral: Negative for depression.  The patient is not nervous/anxious.      Endocrine: Negative for polyuria    Hematologic/Lymphatic: Negative for bleeding problem.  Does not bruise/bleed easily.    Skin: Negative for poor would healing and rash    Objective:      Physical Examination:    Vital Signs:  There were no vitals filed for this visit.    Body mass index is 35.15 kg/m².    This a well-developed, well nourished patient in no acute distress.  They are alert and oriented and cooperative to examination.        Examination of the right knee, no effusion, skin is dry and intact, no erythema or ecchymosis, no signs symptoms of infection.  Range of motion 0-120 degrees.  Stable anterior-posterior varus and valgus stress.  Calf soft nontender,  "straight leg raise negative.  Patient is tender over the medial joint line, specifically the posterior corner.  She also has pain with Matt's testing.    Pertinent New Results:    XRAY Report / Interpretation:       Assessment/Plan:      48-year-old female with right knee pain, now with new symptoms of instability.  Medial joint line pain.  I suspect that she has a medial meniscal tear.  We have placed an order for MRI of the right knee, we will see her back with those results.     Pedro Almendarez, Physician Assistant, served in the capacity as a "scribe" for this patient encounter.  A "face-to-face" encounter occurred with Dr. Néstor Akins on this date.  The treatment plan and medical decision-making is outlined above. Patient was seen and examined with a chaperone.       This note was created using [employer] voice recognition software that occasionally misinterpreted phrases or words.        "

## 2023-12-22 ENCOUNTER — HOSPITAL ENCOUNTER (OUTPATIENT)
Dept: RADIOLOGY | Facility: HOSPITAL | Age: 48
Discharge: HOME | End: 2023-12-22
Attending: ORTHOPAEDIC SURGERY
Payer: COMMERCIAL

## 2023-12-22 DIAGNOSIS — M23.203 DEGENERATIVE TEAR OF MEDIAL MENISCUS OF RIGHT KNEE: ICD-10-CM

## 2023-12-22 PROCEDURE — 73721 MRI JNT OF LWR EXTRE W/O DYE: CPT | Mod: TC,PO,RT

## 2023-12-28 ENCOUNTER — OFFICE VISIT (OUTPATIENT)
Dept: ORTHOPEDICS | Facility: CLINIC | Age: 48
End: 2023-12-28
Payer: COMMERCIAL

## 2023-12-28 VITALS — HEIGHT: 69 IN | WEIGHT: 238 LBS | BODY MASS INDEX: 35.25 KG/M2

## 2023-12-28 DIAGNOSIS — M22.41 CHONDROMALACIA, PATELLA, RIGHT: ICD-10-CM

## 2023-12-28 DIAGNOSIS — M23.203 DEGENERATIVE TEAR OF MEDIAL MENISCUS OF RIGHT KNEE: Primary | ICD-10-CM

## 2023-12-28 DIAGNOSIS — M17.11 PRIMARY OSTEOARTHRITIS OF RIGHT KNEE: Primary | ICD-10-CM

## 2023-12-28 DIAGNOSIS — M23.203 DEGENERATIVE TEAR OF MEDIAL MENISCUS OF RIGHT KNEE: ICD-10-CM

## 2023-12-28 PROCEDURE — 4010F PR ACE/ARB THEARPY RXD/TAKEN: ICD-10-PCS | Mod: CPTII,S$GLB,, | Performed by: ORTHOPAEDIC SURGERY

## 2023-12-28 PROCEDURE — 1159F MED LIST DOCD IN RCRD: CPT | Mod: CPTII,S$GLB,, | Performed by: ORTHOPAEDIC SURGERY

## 2023-12-28 PROCEDURE — 4010F ACE/ARB THERAPY RXD/TAKEN: CPT | Mod: CPTII,S$GLB,, | Performed by: ORTHOPAEDIC SURGERY

## 2023-12-28 PROCEDURE — 3008F BODY MASS INDEX DOCD: CPT | Mod: CPTII,S$GLB,, | Performed by: ORTHOPAEDIC SURGERY

## 2023-12-28 PROCEDURE — 3008F PR BODY MASS INDEX (BMI) DOCUMENTED: ICD-10-PCS | Mod: CPTII,S$GLB,, | Performed by: ORTHOPAEDIC SURGERY

## 2023-12-28 PROCEDURE — 1159F PR MEDICATION LIST DOCUMENTED IN MEDICAL RECORD: ICD-10-PCS | Mod: CPTII,S$GLB,, | Performed by: ORTHOPAEDIC SURGERY

## 2023-12-28 PROCEDURE — 1160F RVW MEDS BY RX/DR IN RCRD: CPT | Mod: CPTII,S$GLB,, | Performed by: ORTHOPAEDIC SURGERY

## 2023-12-28 PROCEDURE — 1160F PR REVIEW ALL MEDS BY PRESCRIBER/CLIN PHARMACIST DOCUMENTED: ICD-10-PCS | Mod: CPTII,S$GLB,, | Performed by: ORTHOPAEDIC SURGERY

## 2023-12-28 PROCEDURE — 99213 PR OFFICE/OUTPT VISIT, EST, LEVL III, 20-29 MIN: ICD-10-PCS | Mod: S$GLB,,, | Performed by: ORTHOPAEDIC SURGERY

## 2023-12-28 PROCEDURE — 99213 OFFICE O/P EST LOW 20 MIN: CPT | Mod: S$GLB,,, | Performed by: ORTHOPAEDIC SURGERY

## 2023-12-28 RX ORDER — CEFAZOLIN SODIUM 2 G/50ML
2 SOLUTION INTRAVENOUS
Status: CANCELLED | OUTPATIENT
Start: 2023-12-28

## 2023-12-28 NOTE — PROGRESS NOTES
[facility]    Subjective:     Chief Complaint:   Chief Complaint   Patient presents with    Right Knee - Pain     Right knee pain follow up. Here for MRI results       Past Medical History:   Diagnosis Date    Acid reflux     Coronary artery disease     Hyperlipemia     Hypertension        Past Surgical History:   Procedure Laterality Date    CERVICAL DISC SURGERY      CHOLECYSTECTOMY      CORONARY ANGIOPLASTY WITH STENT PLACEMENT      DILATION AND CURETTAGE OF UTERUS      THORACIC DISC SURGERY         Current Outpatient Medications   Medication Sig    aspirin (ECOTRIN) 81 MG EC tablet Take 81 mg by mouth once daily.    clopidogreL (PLAVIX) 75 mg tablet TAKE 1 TABLET BY MOUTH ONCE DAILY FOR 90 DAYS    irbesartan (AVAPRO) 150 MG tablet TAKE 1 TABLET BY MOUTH ONCE DAILY FOR 90 DAYS    meclizine (ANTIVERT) 12.5 mg tablet TK 1 T PO BID PRN    meloxicam (MOBIC) 15 MG tablet meloxicam 15 mg tablet   TAKE 1 TABLET BY MOUTH EVERY DAY    pravastatin (PRAVACHOL) 80 MG tablet Take 80 mg by mouth once daily.    rosuvastatin (CRESTOR) 10 MG tablet Take 10 mg by mouth every morning.    sertraline (ZOLOFT) 100 MG tablet Take 100 mg by mouth every morning.    citalopram (CELEXA) 20 MG tablet TK 2 TS PO QD IN THE MORNING    mirabegron (MYRBETRIQ) 25 mg Tb24 ER tablet Take 1 tablet (25 mg total) by mouth once daily. (Patient not taking: Reported on 9/29/2023)    MOUNJARO 2.5 mg/0.5 mL PnIj Inject into the skin.    pantoprazole (PROTONIX) 40 MG tablet TAKE 1 TABLET BY MOUTH ONCE DAILY FOR 90 DAYS     No current facility-administered medications for this visit.       Review of patient's allergies indicates:   Allergen Reactions    Effexor [venlafaxine]        History reviewed. No pertinent family history.    Social History     Socioeconomic History    Marital status:    Tobacco Use    Smoking status: Former       History of present illness: 48 female returns to clinic today for the right knee.  Over the course of the  last 12 months, in January and in September we injected the right knee.  She which is having some generalized knee pain or discomfort.  However over the last 3 months she is developed some feelings of instability.  She states that when she walks now, she feels an audible click or a pop, she does get sharp debilitating pain and instability which makes the knee feel like it wants to give out.  We ordered MRI of the right knee, she is here today to review those results.      Review of Systems:    Constitution: Negative for chills, fever, and sweats.  Negative for unexplained weight loss.    HENT:  Negative for headaches and blurry vision.    Cardiovascular:Negative for chest pain or irregular heart beat. Negative for hypertension.    Respiratory:  Negative for cough and shortness of breath.    Gastrointestinal: Negative for abdominal pain, heartburn, melena, nausea, and vomitting.    Genitourinary:  Negative bladder incontinence and dysuria.    Musculoskeletal:  See HPI for details.     Neurological: Negative for numbness.    Psychiatric/Behavioral: Negative for depression.  The patient is not nervous/anxious.      Endocrine: Negative for polyuria    Hematologic/Lymphatic: Negative for bleeding problem.  Does not bruise/bleed easily.    Skin: Negative for poor would healing and rash    Objective:      Physical Examination:    Vital Signs:  There were no vitals filed for this visit.    Body mass index is 35.15 kg/m².    This a well-developed, well nourished patient in no acute distress.  They are alert and oriented and cooperative to examination.        Examination of the right knee, no effusion, skin is dry and intact, no erythema or ecchymosis, no signs symptoms of infection.  Range of motion 0-120 degrees.  Stable anterior-posterior varus and valgus stress.  Calf soft nontender, straight leg raise negative.  Patient remains tender over the medial joint line, specifically the posterior corner.  She also has pain with  Matt's testing.     Pertinent New Results:  Narrative & Impression  REASON: Knee pain.     TECHNIQUE: Multiplanar and multi sequential MRI of the right knee, without IV contrast.     COMPARISON: Knee radiographs September 29, 2023.     FINDINGS:     There is focal free edge irregularity of the body of the medial meniscus, possibly reflecting a small radial tear. Lateral meniscus is intact. The anterior and posterior cruciate ligaments are intact. There is trace fluid superficial and deep to the MCL with intact ligament fibers. The lateral ligamentous complex is intact. The extensor mechanism and patellar retinaculum are intact.     Small knee joint effusion observed. There is focal near full-thickness cartilage loss of the central medial femoral condyle with associated osteophytosis and subchondral cystic degeneration. The lateral compartment and patellofemoral compartment articular cartilage is within normal limits. There is mild Hoffa's fat pad edema.     Small Serrano's cyst noted with fluid along the margins of the gastrocnemius muscle, consistent with rupture of Baker's cyst. The muscles and tendons of the knee are otherwise unremarkable.     IMPRESSION:     1.  Focal free edge irregularity of the body of the medial meniscus possibly reflecting a small radial tear.  2.  Mild MCL sprain.  3.  Focal near full-thickness cartilage loss of the central medial femoral condyle with mild medial femoral condyle osteophytosis and subchondral cyst formation.  4.  Small knee joint effusion with mild Hoffa's fat pad edema.  5.  Small ruptured Serrano's cyst.     Electronically signed by:  Mathew Regalado DO  12/22/2023 08:42 AM CST Workstation: [id number]           Specimen Collected: 12/22/23 06:29 CST Last Resulted: 12/22/23 08:42 CST           XRAY Report / Interpretation:   X-rays from September of this year reviewed, she has some mild squaring medial joint line some appearance of loss of medial joint space certainly not  "bone-on-bone appearance.  Mild-to-moderate osteoarthritic changes.    Assessment/Plan:      Follow-up for right knee pain, MRI results show a medial meniscal tear, she does have some full-thickness cartilage loss over the medial femoral condyle.  We discussed right knee arthroscopy with partial medial meniscectomy and chondroplasty.  We will schedule this at the next available appointment.    Patient was consented for surgery. Risks and benefits of the procedure were discussed in great detail to include the expected preoperative, intraoperative and postoperative courses. Complications may include but are not limited to bleeding, infection, damage to nerves, arteries, blood vessels, bones, tendons, ligaments, stiffness, instability, deep vein thrombus (DVT), pulmonary embolus (PE), altered sensation, continued pain, RSD, loss of motion or a need for additional surgery. As well as general anesthetic complications including seizure, stroke, heart attack and even death.    Pedro Almendarez, Physician Assistant, served in the capacity as a "scribe" for this patient encounter.  A "face-to-face" encounter occurred with Dr. Néstor Akins on this date.  The treatment plan and medical decision-making is outlined above. Patient was seen and examined with a chaperone.       This note was created using Dragon voice recognition software that occasionally misinterpreted phrases or words.        "

## 2023-12-28 NOTE — H&P (VIEW-ONLY)
[facility]    Subjective:     Chief Complaint:   Chief Complaint   Patient presents with    Right Knee - Pain     Right knee pain follow up. Here for MRI results       Past Medical History:   Diagnosis Date    Acid reflux     Coronary artery disease     Hyperlipemia     Hypertension        Past Surgical History:   Procedure Laterality Date    CERVICAL DISC SURGERY      CHOLECYSTECTOMY      CORONARY ANGIOPLASTY WITH STENT PLACEMENT      DILATION AND CURETTAGE OF UTERUS      THORACIC DISC SURGERY         Current Outpatient Medications   Medication Sig    aspirin (ECOTRIN) 81 MG EC tablet Take 81 mg by mouth once daily.    clopidogreL (PLAVIX) 75 mg tablet TAKE 1 TABLET BY MOUTH ONCE DAILY FOR 90 DAYS    irbesartan (AVAPRO) 150 MG tablet TAKE 1 TABLET BY MOUTH ONCE DAILY FOR 90 DAYS    meclizine (ANTIVERT) 12.5 mg tablet TK 1 T PO BID PRN    meloxicam (MOBIC) 15 MG tablet meloxicam 15 mg tablet   TAKE 1 TABLET BY MOUTH EVERY DAY    pravastatin (PRAVACHOL) 80 MG tablet Take 80 mg by mouth once daily.    rosuvastatin (CRESTOR) 10 MG tablet Take 10 mg by mouth every morning.    sertraline (ZOLOFT) 100 MG tablet Take 100 mg by mouth every morning.    citalopram (CELEXA) 20 MG tablet TK 2 TS PO QD IN THE MORNING    mirabegron (MYRBETRIQ) 25 mg Tb24 ER tablet Take 1 tablet (25 mg total) by mouth once daily. (Patient not taking: Reported on 9/29/2023)    MOUNJARO 2.5 mg/0.5 mL PnIj Inject into the skin.    pantoprazole (PROTONIX) 40 MG tablet TAKE 1 TABLET BY MOUTH ONCE DAILY FOR 90 DAYS     No current facility-administered medications for this visit.       Review of patient's allergies indicates:   Allergen Reactions    Effexor [venlafaxine]        History reviewed. No pertinent family history.    Social History     Socioeconomic History    Marital status:    Tobacco Use    Smoking status: Former       History of present illness: 48 female returns to clinic today for the right knee.  Over the course of the  last 12 months, in January and in September we injected the right knee.  She which is having some generalized knee pain or discomfort.  However over the last 3 months she is developed some feelings of instability.  She states that when she walks now, she feels an audible click or a pop, she does get sharp debilitating pain and instability which makes the knee feel like it wants to give out.  We ordered MRI of the right knee, she is here today to review those results.      Review of Systems:    Constitution: Negative for chills, fever, and sweats.  Negative for unexplained weight loss.    HENT:  Negative for headaches and blurry vision.    Cardiovascular:Negative for chest pain or irregular heart beat. Negative for hypertension.    Respiratory:  Negative for cough and shortness of breath.    Gastrointestinal: Negative for abdominal pain, heartburn, melena, nausea, and vomitting.    Genitourinary:  Negative bladder incontinence and dysuria.    Musculoskeletal:  See HPI for details.     Neurological: Negative for numbness.    Psychiatric/Behavioral: Negative for depression.  The patient is not nervous/anxious.      Endocrine: Negative for polyuria    Hematologic/Lymphatic: Negative for bleeding problem.  Does not bruise/bleed easily.    Skin: Negative for poor would healing and rash    Objective:      Physical Examination:    Vital Signs:  There were no vitals filed for this visit.    Body mass index is 35.15 kg/m².    This a well-developed, well nourished patient in no acute distress.  They are alert and oriented and cooperative to examination.        Examination of the right knee, no effusion, skin is dry and intact, no erythema or ecchymosis, no signs symptoms of infection.  Range of motion 0-120 degrees.  Stable anterior-posterior varus and valgus stress.  Calf soft nontender, straight leg raise negative.  Patient remains tender over the medial joint line, specifically the posterior corner.  She also has pain with  Matt's testing.     Pertinent New Results:  Narrative & Impression  REASON: Knee pain.     TECHNIQUE: Multiplanar and multi sequential MRI of the right knee, without IV contrast.     COMPARISON: Knee radiographs September 29, 2023.     FINDINGS:     There is focal free edge irregularity of the body of the medial meniscus, possibly reflecting a small radial tear. Lateral meniscus is intact. The anterior and posterior cruciate ligaments are intact. There is trace fluid superficial and deep to the MCL with intact ligament fibers. The lateral ligamentous complex is intact. The extensor mechanism and patellar retinaculum are intact.     Small knee joint effusion observed. There is focal near full-thickness cartilage loss of the central medial femoral condyle with associated osteophytosis and subchondral cystic degeneration. The lateral compartment and patellofemoral compartment articular cartilage is within normal limits. There is mild Hoffa's fat pad edema.     Small Serrano's cyst noted with fluid along the margins of the gastrocnemius muscle, consistent with rupture of Baker's cyst. The muscles and tendons of the knee are otherwise unremarkable.     IMPRESSION:     1.  Focal free edge irregularity of the body of the medial meniscus possibly reflecting a small radial tear.  2.  Mild MCL sprain.  3.  Focal near full-thickness cartilage loss of the central medial femoral condyle with mild medial femoral condyle osteophytosis and subchondral cyst formation.  4.  Small knee joint effusion with mild Hoffa's fat pad edema.  5.  Small ruptured Serrano's cyst.     Electronically signed by:  Mathew Regalado DO  12/22/2023 08:42 AM CST Workstation: [id number]           Specimen Collected: 12/22/23 06:29 CST Last Resulted: 12/22/23 08:42 CST           XRAY Report / Interpretation:   X-rays from September of this year reviewed, she has some mild squaring medial joint line some appearance of loss of medial joint space certainly not  "bone-on-bone appearance.  Mild-to-moderate osteoarthritic changes.    Assessment/Plan:      Follow-up for right knee pain, MRI results show a medial meniscal tear, she does have some full-thickness cartilage loss over the medial femoral condyle.  We discussed right knee arthroscopy with partial medial meniscectomy and chondroplasty.  We will schedule this at the next available appointment.    Patient was consented for surgery. Risks and benefits of the procedure were discussed in great detail to include the expected preoperative, intraoperative and postoperative courses. Complications may include but are not limited to bleeding, infection, damage to nerves, arteries, blood vessels, bones, tendons, ligaments, stiffness, instability, deep vein thrombus (DVT), pulmonary embolus (PE), altered sensation, continued pain, RSD, loss of motion or a need for additional surgery. As well as general anesthetic complications including seizure, stroke, heart attack and even death.    Pedro Almendarez, Physician Assistant, served in the capacity as a "scribe" for this patient encounter.  A "face-to-face" encounter occurred with Dr. Néstor Akins on this date.  The treatment plan and medical decision-making is outlined above. Patient was seen and examined with a chaperone.       This note was created using Dragon voice recognition software that occasionally misinterpreted phrases or words.        "

## 2023-12-29 ENCOUNTER — PATIENT MESSAGE (OUTPATIENT)
Dept: ORTHOPEDICS | Facility: CLINIC | Age: 48
End: 2023-12-29

## 2024-01-17 ENCOUNTER — HOSPITAL ENCOUNTER (OUTPATIENT)
Dept: PREADMISSION TESTING | Facility: HOSPITAL | Age: 49
Discharge: HOME | End: 2024-01-17
Attending: ORTHOPAEDIC SURGERY
Payer: COMMERCIAL

## 2024-01-17 VITALS
BODY MASS INDEX: 35.25 KG/M2 | OXYGEN SATURATION: 98 % | SYSTOLIC BLOOD PRESSURE: 130 MMHG | HEART RATE: 84 BPM | RESPIRATION RATE: 14 BRPM | WEIGHT: 238 LBS | DIASTOLIC BLOOD PRESSURE: 74 MMHG | TEMPERATURE: 98 F | HEIGHT: 69 IN

## 2024-01-17 DIAGNOSIS — M22.41 CHONDROMALACIA, PATELLA, RIGHT: ICD-10-CM

## 2024-01-17 DIAGNOSIS — M23.203 DEGENERATIVE TEAR OF MEDIAL MENISCUS OF RIGHT KNEE: ICD-10-CM

## 2024-01-17 DIAGNOSIS — Z01.818 PRE-OP TESTING: Primary | ICD-10-CM

## 2024-01-17 PROCEDURE — 93005 ELECTROCARDIOGRAM TRACING: CPT | Performed by: INTERNAL MEDICINE

## 2024-01-17 PROCEDURE — 93010 ELECTROCARDIOGRAM REPORT: CPT | Mod: ,,, | Performed by: INTERNAL MEDICINE

## 2024-01-17 NOTE — DISCHARGE INSTRUCTIONS
INSTRUCTIONS  To confirm your doctor has scheduled your surgery for:    COVID TESTING SCHEDULED:     Morning of surgery please check in with registration near Parking Garage Entrance then proceed to Outpatient Surgery Department.    Preop nurses will call the afternoon prior to surgery between 4:00 and 6:00 PM with your final arrival time.  PLEASE NOTE:  The surgery schedule has many variables which may affect the time of your surgery case. Family members should be available if your surgery time changes. Plan to be here the day of your procedure between 4-6 hours.    TAKE ONLY THESE MEDICATIONS WITH A SMALL SIP OF WATER THE MORNING OF SURGERY: see list    DO NOT TAKE THESE MEDICATIONS 5-7 DAYS PRIOR to your procedure per your surgeon's request: ASPIRIN, ALEVE, BC powder, DAVID SELTZER, IBUPROFEN, FISH OIL, VITAMIN E, OR HERBALS   (May take Tylenol)    If you are prescribed any types of blood thinners (Aspirin, Coumadin, Plavix, Pradaxa, Xarelto, Aggrenox, Effient, Eliquis, Savasya, Brilinta or any other), please ask your surgeon how many days before scheduled procedure should you stop taking them. You may also need to verify with prescribing physician if it is OK to stop your blood thinners.      INSTRUCTIONS IMPORTANT!!  Do not eat or drink anything after midnight.  ONLY if you are diabetic, check your sugar in the morning before your procedure.  Do not smoke, vape or drink alcoholic beverages 24 hours prior to your procedure.  Shower the night before AND the morning of your procedure with a Chlorhexidine wash such as hibiclens or Dial antibacterial soap from neck down. You may use your own shampoo and face wash. This helps your skin to be as bacteria free as possible.  If you wear contact lenses, dentures, hearing aids or glasses, bring a container to put them in and give to a family member.    Please leave all jewelry, piercings and valuables at home.  DO NOT remove hair from the surgery site.   If your condition  changes such as fever, cough, etc, please notify your surgeon.   ONLY if you have been diagnosed with sleep apnea please bring your C-PAP machine.  ONLY if you wear home oxygen please bring your portable oxygen tank the day of your procedure.   ONLY for patients requiring bowel prep, written instructions will be given by your doctor's office.  ONLY if you have a neuro stimulator, please bring the controller with you the morning of surgery  Make arrangements in advance for transportation home by a responsible adult.  You must make arrangements for transportation, TAXI'S, UBER'S OR LYFTS ARE NOT ALLOWED.        If you have any questions about these instructions, call Pre-Op Admit  Nursing at [phone] or the Pre-Op Day Surgery Unit at [phone].

## 2024-01-23 ENCOUNTER — ANESTHESIA EVENT (OUTPATIENT)
Dept: SURGERY | Facility: HOSPITAL | Age: 49
End: 2024-01-23
Payer: COMMERCIAL

## 2024-01-23 DIAGNOSIS — M23.203 DEGENERATIVE TEAR OF MEDIAL MENISCUS OF RIGHT KNEE: Primary | ICD-10-CM

## 2024-01-23 RX ORDER — OXYCODONE AND ACETAMINOPHEN 7.5; 325 MG/1; MG/1
1 TABLET ORAL EVERY 6 HOURS PRN
Qty: 28 TABLET | Refills: 0 | Status: SHIPPED | OUTPATIENT
Start: 2024-01-23 | End: 2024-01-25

## 2024-01-24 ENCOUNTER — HOSPITAL ENCOUNTER (OUTPATIENT)
Facility: HOSPITAL | Age: 49
Discharge: HOME | End: 2024-01-24
Attending: ORTHOPAEDIC SURGERY | Admitting: ORTHOPAEDIC SURGERY
Payer: COMMERCIAL

## 2024-01-24 ENCOUNTER — ANESTHESIA (OUTPATIENT)
Dept: SURGERY | Facility: HOSPITAL | Age: 49
End: 2024-01-24
Payer: COMMERCIAL

## 2024-01-24 VITALS
TEMPERATURE: 98 F | WEIGHT: 237 LBS | OXYGEN SATURATION: 99 % | HEART RATE: 75 BPM | DIASTOLIC BLOOD PRESSURE: 78 MMHG | HEIGHT: 70 IN | BODY MASS INDEX: 33.93 KG/M2 | RESPIRATION RATE: 16 BRPM | SYSTOLIC BLOOD PRESSURE: 136 MMHG

## 2024-01-24 DIAGNOSIS — M23.203 DEGENERATIVE TEAR OF MEDIAL MENISCUS OF RIGHT KNEE: ICD-10-CM

## 2024-01-24 DIAGNOSIS — M22.41 CHONDROMALACIA, PATELLA, RIGHT: Primary | ICD-10-CM

## 2024-01-24 DIAGNOSIS — Z01.818 PRE-OP TESTING: ICD-10-CM

## 2024-01-24 LAB
B-HCG UR QL: NEGATIVE
CTP QC/QA: YES

## 2024-01-24 PROCEDURE — 71000039 HC RECOVERY, EACH ADD'L HOUR: Performed by: ORTHOPAEDIC SURGERY

## 2024-01-24 PROCEDURE — 29880 ARTHRS KNE SRG MNISECTMY M&L: CPT | Mod: RT,,, | Performed by: ORTHOPAEDIC SURGERY

## 2024-01-24 PROCEDURE — 81025 URINE PREGNANCY TEST: CPT | Performed by: ANESTHESIOLOGY

## 2024-01-24 PROCEDURE — 71000015 HC POSTOP RECOV 1ST HR: Performed by: ORTHOPAEDIC SURGERY

## 2024-01-24 PROCEDURE — 37000008 HC ANESTHESIA 1ST 15 MINUTES: Performed by: ORTHOPAEDIC SURGERY

## 2024-01-24 PROCEDURE — 63600175 PHARM REV CODE 636 W HCPCS: Mod: JZ,JG | Performed by: ORTHOPAEDIC SURGERY

## 2024-01-24 PROCEDURE — 71000033 HC RECOVERY, INTIAL HOUR: Performed by: ORTHOPAEDIC SURGERY

## 2024-01-24 PROCEDURE — D9220A PRA ANESTHESIA: Mod: ,,, | Performed by: ANESTHESIOLOGY

## 2024-01-24 PROCEDURE — 36000710: Performed by: ORTHOPAEDIC SURGERY

## 2024-01-24 PROCEDURE — 25000003 PHARM REV CODE 250: Performed by: NURSE ANESTHETIST, CERTIFIED REGISTERED

## 2024-01-24 PROCEDURE — 27201423 OPTIME MED/SURG SUP & DEVICES STERILE SUPPLY: Performed by: ORTHOPAEDIC SURGERY

## 2024-01-24 PROCEDURE — 63600175 PHARM REV CODE 636 W HCPCS: Performed by: STUDENT IN AN ORGANIZED HEALTH CARE EDUCATION/TRAINING PROGRAM

## 2024-01-24 PROCEDURE — 63600175 PHARM REV CODE 636 W HCPCS: Performed by: NURSE ANESTHETIST, CERTIFIED REGISTERED

## 2024-01-24 PROCEDURE — 37000009 HC ANESTHESIA EA ADD 15 MINS: Performed by: ORTHOPAEDIC SURGERY

## 2024-01-24 PROCEDURE — 36000711: Performed by: ORTHOPAEDIC SURGERY

## 2024-01-24 PROCEDURE — 63600175 PHARM REV CODE 636 W HCPCS: Performed by: ORTHOPAEDIC SURGERY

## 2024-01-24 PROCEDURE — 25000003 PHARM REV CODE 250: Performed by: STUDENT IN AN ORGANIZED HEALTH CARE EDUCATION/TRAINING PROGRAM

## 2024-01-24 RX ORDER — DIPHENHYDRAMINE HYDROCHLORIDE 50 MG/ML
12.5 INJECTION INTRAMUSCULAR; INTRAVENOUS
Status: DISCONTINUED | OUTPATIENT
Start: 2024-01-24 | End: 2024-01-24 | Stop reason: HOSPADM

## 2024-01-24 RX ORDER — LIDOCAINE HYDROCHLORIDE 20 MG/ML
JELLY TOPICAL
Status: DISCONTINUED | OUTPATIENT
Start: 2024-01-24 | End: 2024-01-24

## 2024-01-24 RX ORDER — MIDAZOLAM HYDROCHLORIDE 1 MG/ML
INJECTION INTRAMUSCULAR; INTRAVENOUS
Status: DISCONTINUED | OUTPATIENT
Start: 2024-01-24 | End: 2024-01-24

## 2024-01-24 RX ORDER — HYDROCODONE BITARTRATE AND ACETAMINOPHEN 5; 325 MG/1; MG/1
1 TABLET ORAL EVERY 4 HOURS PRN
Status: CANCELLED | OUTPATIENT
Start: 2024-01-24

## 2024-01-24 RX ORDER — ONDANSETRON HYDROCHLORIDE 2 MG/ML
4 INJECTION, SOLUTION INTRAVENOUS DAILY PRN
Status: DISCONTINUED | OUTPATIENT
Start: 2024-01-24 | End: 2024-01-24 | Stop reason: HOSPADM

## 2024-01-24 RX ORDER — CEFAZOLIN SODIUM 2 G/50ML
2 SOLUTION INTRAVENOUS
Status: COMPLETED | OUTPATIENT
Start: 2024-01-24 | End: 2024-01-24

## 2024-01-24 RX ORDER — MUPIROCIN 20 MG/G
OINTMENT TOPICAL 2 TIMES DAILY
Status: CANCELLED | OUTPATIENT
Start: 2024-01-24 | End: 2024-01-29

## 2024-01-24 RX ORDER — EPHEDRINE SULFATE 50 MG/ML
INJECTION, SOLUTION INTRAVENOUS
Status: DISCONTINUED | OUTPATIENT
Start: 2024-01-24 | End: 2024-01-24

## 2024-01-24 RX ORDER — BUPIVACAINE HYDROCHLORIDE 5 MG/ML
INJECTION, SOLUTION EPIDURAL; INTRACAUDAL
Status: DISCONTINUED | OUTPATIENT
Start: 2024-01-24 | End: 2024-01-24 | Stop reason: HOSPADM

## 2024-01-24 RX ORDER — FENTANYL CITRATE 50 UG/ML
INJECTION, SOLUTION INTRAMUSCULAR; INTRAVENOUS
Status: DISCONTINUED | OUTPATIENT
Start: 2024-01-24 | End: 2024-01-24

## 2024-01-24 RX ORDER — ONDANSETRON HYDROCHLORIDE 2 MG/ML
INJECTION, SOLUTION INTRAVENOUS
Status: DISCONTINUED | OUTPATIENT
Start: 2024-01-24 | End: 2024-01-24

## 2024-01-24 RX ORDER — SODIUM CHLORIDE, SODIUM LACTATE, POTASSIUM CHLORIDE, CALCIUM CHLORIDE 600; 310; 30; 20 MG/100ML; MG/100ML; MG/100ML; MG/100ML
INJECTION, SOLUTION INTRAVENOUS CONTINUOUS PRN
Status: DISCONTINUED | OUTPATIENT
Start: 2024-01-24 | End: 2024-01-24

## 2024-01-24 RX ORDER — ACETAMINOPHEN 10 MG/ML
INJECTION, SOLUTION INTRAVENOUS
Status: DISCONTINUED | OUTPATIENT
Start: 2024-01-24 | End: 2024-01-24

## 2024-01-24 RX ORDER — METHYLPREDNISOLONE ACETATE 80 MG/ML
INJECTION, SUSPENSION INTRA-ARTICULAR; INTRALESIONAL; INTRAMUSCULAR; SOFT TISSUE
Status: DISCONTINUED | OUTPATIENT
Start: 2024-01-24 | End: 2024-01-24 | Stop reason: HOSPADM

## 2024-01-24 RX ORDER — HYDROMORPHONE HYDROCHLORIDE 1 MG/ML
0.2 INJECTION, SOLUTION INTRAMUSCULAR; INTRAVENOUS; SUBCUTANEOUS EVERY 5 MIN PRN
Status: DISCONTINUED | OUTPATIENT
Start: 2024-01-24 | End: 2024-01-24 | Stop reason: HOSPADM

## 2024-01-24 RX ORDER — FAMOTIDINE 10 MG/ML
INJECTION INTRAVENOUS
Status: DISCONTINUED | OUTPATIENT
Start: 2024-01-24 | End: 2024-01-24

## 2024-01-24 RX ORDER — LIDOCAINE HYDROCHLORIDE 20 MG/ML
INJECTION, SOLUTION EPIDURAL; INFILTRATION; INTRACAUDAL; PERINEURAL
Status: DISCONTINUED | OUTPATIENT
Start: 2024-01-24 | End: 2024-01-24

## 2024-01-24 RX ORDER — OXYCODONE HYDROCHLORIDE 5 MG/1
5 TABLET ORAL
Status: DISCONTINUED | OUTPATIENT
Start: 2024-01-24 | End: 2024-01-24 | Stop reason: HOSPADM

## 2024-01-24 RX ORDER — PROPOFOL 10 MG/ML
VIAL (ML) INTRAVENOUS
Status: DISCONTINUED | OUTPATIENT
Start: 2024-01-24 | End: 2024-01-24

## 2024-01-24 RX ADMIN — FENTANYL CITRATE 50 MCG: 50 INJECTION, SOLUTION INTRAMUSCULAR; INTRAVENOUS at 07:01

## 2024-01-24 RX ADMIN — PROPOFOL 50 MG: 10 INJECTION, EMULSION INTRAVENOUS at 08:01

## 2024-01-24 RX ADMIN — HYDROMORPHONE HYDROCHLORIDE 0.2 MG: 1 INJECTION, SOLUTION INTRAMUSCULAR; INTRAVENOUS; SUBCUTANEOUS at 08:01

## 2024-01-24 RX ADMIN — OXYCODONE HYDROCHLORIDE 5 MG: 5 TABLET ORAL at 08:01

## 2024-01-24 RX ADMIN — ACETAMINOPHEN 1000 MG: 10 INJECTION, SOLUTION INTRAVENOUS at 07:01

## 2024-01-24 RX ADMIN — EPHEDRINE SULFATE 10 MG: 50 INJECTION INTRAVENOUS at 08:01

## 2024-01-24 RX ADMIN — EPHEDRINE SULFATE 20 MG: 50 INJECTION INTRAVENOUS at 07:01

## 2024-01-24 RX ADMIN — MIDAZOLAM HYDROCHLORIDE 2 MG: 1 INJECTION, SOLUTION INTRAMUSCULAR; INTRAVENOUS at 07:01

## 2024-01-24 RX ADMIN — SODIUM CHLORIDE, SODIUM LACTATE, POTASSIUM CHLORIDE, AND CALCIUM CHLORIDE: .6; .31; .03; .02 INJECTION, SOLUTION INTRAVENOUS at 07:01

## 2024-01-24 RX ADMIN — CEFAZOLIN SODIUM 2 G: 2 SOLUTION INTRAVENOUS at 07:01

## 2024-01-24 RX ADMIN — HYDROMORPHONE HYDROCHLORIDE 0.2 MG: 1 INJECTION, SOLUTION INTRAMUSCULAR; INTRAVENOUS; SUBCUTANEOUS at 09:01

## 2024-01-24 RX ADMIN — LIDOCAINE HYDROCHLORIDE 80 MG: 20 INJECTION, SOLUTION INTRAVENOUS at 07:01

## 2024-01-24 RX ADMIN — ONDANSETRON 4 MG: 2 INJECTION INTRAMUSCULAR; INTRAVENOUS at 07:01

## 2024-01-24 RX ADMIN — PROPOFOL 150 MG: 10 INJECTION, EMULSION INTRAVENOUS at 07:01

## 2024-01-24 RX ADMIN — LIDOCAINE HYDROCHLORIDE 4 ML: 20 JELLY TOPICAL at 07:01

## 2024-01-24 RX ADMIN — FAMOTIDINE 20 MG: 10 INJECTION, SOLUTION INTRAVENOUS at 07:01

## 2024-01-24 NOTE — ANESTHESIA PREPROCEDURE EVALUATION
01/24/2024  Julieth Lozano is a 48 y.o., female.      There is no problem list on file for this patient.      Past Surgical History:   Procedure Laterality Date    CERVICAL DISC SURGERY      CHOLECYSTECTOMY      CORONARY ANGIOPLASTY WITH STENT PLACEMENT      DILATION AND CURETTAGE OF UTERUS      THORACIC DISC SURGERY          Tobacco Use:  The patient  reports that she has quit smoking. She does not have any smokeless tobacco history on file.     Results for orders placed or performed during the hospital encounter of 01/17/24   EKG 12-lead    Collection Time: 01/17/24  7:31 AM    Narrative    Test Reason : M23.203,M22.41,    Vent. Rate : 055 BPM     Atrial Rate : 055 BPM     P-R Int : 146 ms          QRS Dur : 088 ms      QT Int : 450 ms       P-R-T Axes : 043 028 039 degrees     QTc Int : 430 ms    Sinus bradycardia  Otherwise normal ECG  No previous ECGs available  Confirmed by Jericho Adame MD [id number]) on 1/22/2024 9:59:15 AM    Referred By:             Confirmed By:Jericho Adame MD             Lab Results   Component Value Date    WBC 5.66 01/17/2024    HGB 12.7 01/17/2024    HCT 37.5 01/17/2024    MCV 89 01/17/2024     01/17/2024     BMP  Lab Results   Component Value Date     01/17/2024    K 3.5 01/17/2024     01/17/2024    CO2 26 01/17/2024    BUN 9 01/17/2024    CREATININE 0.7 01/17/2024    CALCIUM 9.0 01/17/2024    ANIONGAP 10 01/17/2024     (H) 01/17/2024       No results found for this or any previous visit.            Pre-op Assessment    I have reviewed the Patient Summary Reports.     I have reviewed the Nursing Notes. I have reviewed the NPO Status.   I have reviewed the Medications.     Review of Systems  Anesthesia Hx:  No problems with previous Anesthesia             Denies Family Hx of Anesthesia complications.    Denies Personal Hx of Anesthesia complications.                     Social:  Former Smoker       Hematology/Oncology:  Hematology Normal   Oncology Normal                                   EENT/Dental:  EENT/Dental Normal           Cardiovascular:     Hypertension   CAD  asymptomatic CABG/stent           ECG has been reviewed.                          Pulmonary:        Sleep Apnea, CPAP                Renal/:  Renal/ Normal                 Hepatic/GI:     GERD, well controlled             Musculoskeletal:         Spine Disorders: cervical and thoracic            Neurological:  Neurology Normal                                      Endocrine:  Endocrine Normal            Psych:  Psychiatric Normal                    Physical Exam  General: Well nourished, Cooperative, Alert and Oriented    Airway:  Mallampati: II   Mouth Opening: Normal  TM Distance: Normal  Tongue: Normal  Neck ROM: Normal ROM    Dental:  Intact    Chest/Lungs:  Clear to auscultation    Heart:  Rate: Normal  Rhythm: Regular Rhythm  Sounds: Normal        Anesthesia Plan  Type of Anesthesia, risks & benefits discussed:    Anesthesia Type: Gen Supraglottic Airway  Intra-op Monitoring Plan: Standard ASA Monitors  Post Op Pain Control Plan: multimodal analgesia  Induction:  IV  Informed Consent: Informed consent signed with the Patient and all parties understand the risks and agree with anesthesia plan.  All questions answered.   ASA Score: 2  Anesthesia Plan Notes: Ofirmev    Ready For Surgery From Anesthesia Perspective.     .

## 2024-01-24 NOTE — TRANSFER OF CARE
"Anesthesia Transfer of Care Note    Patient: Julieth Lozano    Procedure(s) Performed: Procedure(s) (LRB):  ARTHROSCOPY, KNEE, WITH MENISCECTOMY, RIGHT (Right)    Patient location: PACU    Anesthesia Type: general    Transport from OR: Transported from OR on room air with adequate spontaneous ventilation    Post pain: adequate analgesia    Post assessment: no apparent anesthetic complications    Post vital signs: stable    Level of consciousness: awake and alert    Nausea/Vomiting: no nausea/vomiting    Complications: none    Transfer of care protocol was followed      Last vitals: Visit Vitals  BP (!) 141/89 (BP Location: Left arm, Patient Position: Sitting)   Pulse 60   Temp 36.7 °C (98 °F) (Oral)   Resp 16   Ht 5' 10" (1.778 m)   Wt 107.5 kg (237 lb)   SpO2 98%   Breastfeeding No   BMI 34.01 kg/m²     "

## 2024-01-24 NOTE — ANESTHESIA PROCEDURE NOTES
Intubation    Date/Time: 1/24/2024 7:28 AM    Performed by: Pranav Alicea CRNA  Authorized by: Joselito Olvera Jr., MD    Intubation:     Induction:  Intravenous    Intubated:  Postinduction    Mask Ventilation:  N/a    Attempts:  1    Attempted By:  CRNA    Method of Intubation:  Blind intubation    Difficult Airway Encountered?: No      Complications:  None    Airway Device:  Supraglottic airway/LMA    Airway Device Size:  4.0    Style/Cuff Inflation:  Uncuffed    Secured at:  The lips    Placement Verified By:  Capnometry    Complicating Factors:  None    Findings Post-Intubation:  BS equal bilateral

## 2024-01-24 NOTE — OP NOTE
[facility]  Surgery Department  Operative Note    SUMMARY     Date of Procedure: 1/24/2024     Procedure:  Partial medial and lateral meniscectomies                        Chondroplasty medial femoral condyle                        Chondroplasty patella       Surgeon(s) and Role:     * Néstor Akins MD - Primary    Assisting Surgeon:  Vinicius    Pre-Operative Diagnosis: Degenerative tear of medial meniscus of right knee [M23.203]  Chondromalacia, patella, right [M22.41]    Post-Operative Diagnosis: Post-Op Diagnosis Codes:     * Degenerative tear of medial meniscus of right knee [M23.203]     * Chondromalacia, patella, right [M22.41]    Anesthesia: General    Intraoperative Findings:  The patellofemoral joint was noted to have grade 3 chondromalacia throughout the patella and grade 3 and 4 chondromalacia throughout the trochlea.  The ACL and PCL were intact.  The lateral compartment demonstrated grade 2 chondromalacia throughout with some fraying of the posterior horn and body of the lateral meniscus.  The medial compartment demonstrated grade 3 chondromalacia globally with an area of grade 4 chondromalacia in the central portion of the medial femoral condyle.  There was undersurface fraying of the meniscus with an undersurface flap of the meniscus    Description of the Findings of the Procedure:  Patient was placed on the operative table in supine position.  She was prepped and draped in the usual sterile manner for surgery.  Inferomedial and inferolateral portals were established and diagnostic arthroscopy was undertaken.  The findings of those stated above.  At this point I turned my attention to the patellofemoral joint I used a shaver extensively debrided loose flaps of cartilage from the undersurface of the patella.  Similarly I did debrided large loose fragments of cartilage were which were simply flapping off the trochlea.  I now turned my attention to the lateral compartment I used a shaver  and smoothed the edge of the meniscus a proximally 20% of the posterior horn the body.  At this point I turned my attention to the medial meniscus I used a shaver and debrided the undersurface the meniscus of a flap off the posterior horn.  I now used a shaver and debrided very loose hanging flat of cartilage off the condyle especially in the edges of the grade 4 chondromalacia.  At this point I removed the arthroscopic equipment.  The portals were closed with nylon stitches.  Sterile dressings were applied and the patient was noted to be in stable condition    Complications: No    Estimated Blood Loss (EBL): * No values recorded between 1/24/2024  8:21 AM and 1/24/2024  8:30 AM *           Implants: * No implants in log *    Specimens:   Specimen (24h ago, onward)      None                    Condition: Good    Disposition: PACU - hemodynamically stable.              Discharge Note    SUMMARY     Admit Date: 1/24/2024    Discharge Date and Time:  01/24/2024 8:30 AM    Hospital Course (synopsis of major diagnoses, care, treatment, and services provided during the course of the hospital stay): Uneventful      Final Diagnosis: Post-Op Diagnosis Codes:     * Degenerative tear of medial meniscus of right knee [M23.203]     * Chondromalacia, patella, right [M22.41]    Disposition: Home or Self Care    Follow Up/Patient Instructions:     Medications:  Reconciled Home Medications:   Current Discharge Medication List        CONTINUE these medications which have NOT CHANGED    Details   irbesartan (AVAPRO) 150 MG tablet TAKE 1 TABLET BY MOUTH ONCE DAILY FOR 90 DAYS    Comments: .      pantoprazole (PROTONIX) 40 MG tablet TAKE 1 TABLET BY MOUTH ONCE DAILY FOR 90 DAYS      rosuvastatin (CRESTOR) 10 MG tablet Take 10 mg by mouth every morning.      sertraline (ZOLOFT) 100 MG tablet Take 100 mg by mouth every morning.      aspirin (ECOTRIN) 81 MG EC tablet Take 81 mg by mouth once daily.      clopidogreL (PLAVIX) 75 mg tablet  TAKE 1 TABLET BY MOUTH ONCE DAILY FOR 90 DAYS      meclizine (ANTIVERT) 12.5 mg tablet TK 1 T PO BID PRN      oxyCODONE-acetaminophen (PERCOCET) 7.5-325 mg per tablet Take 1 tablet by mouth every 6 (six) hours as needed for Pain.  Qty: 28 tablet, Refills: 0    Associated Diagnoses: Degenerative tear of medial meniscus of right knee           Discharge Procedure Orders   Diet general     Activity as tolerated     Sponge bath only until clinic visit     Ice to affected area     Weight bearing restrictions (specify)     Remove dressing in 24 hours     Call MD for:  temperature >100.4     Call MD for:  persistent nausea and vomiting     Call MD for:  severe uncontrolled pain     Call MD for:  difficulty breathing, headache or visual disturbances     Call MD for:  redness, tenderness, or signs of infection (pain, swelling, redness, odor or green/yellow discharge around incision site)     Call MD for:  hives     Call MD for:  persistent dizziness or light-headedness     Call MD for:  extreme fatigue     Shower on day dressing removed (No bath)

## 2024-01-24 NOTE — PLAN OF CARE
Patient taken to day surgery via stretcher at this time. Awake and alert not distressful. DION Rosales at bedside to assume care of patient.

## 2024-01-24 NOTE — ANESTHESIA POSTPROCEDURE EVALUATION
Anesthesia Post Evaluation    Patient: Julieth [place]    Procedure(s) Performed: Procedure(s) (LRB):  ARTHROSCOPY, KNEE, WITH MENISCECTOMY (Right)  CHONDROPLASTY, KNEE    Final Anesthesia Type: general      Patient location during evaluation: PACU  Patient participation: Yes- Able to Participate  Level of consciousness: awake and alert  Post-procedure vital signs: reviewed and stable  Pain management: adequate  Airway patency: patent    PONV status at discharge: No PONV  Anesthetic complications: no      Cardiovascular status: blood pressure returned to baseline and stable  Respiratory status: unassisted and room air  Hydration status: euvolemic  Follow-up not needed.              Vitals Value Taken Time   /78 01/24/24 1010   Temp 36.6 °C (97.8 °F) 01/24/24 1010   Pulse 75 01/24/24 1010   Resp 16 01/24/24 1010   SpO2 99 % 01/24/24 1010         Event Time   Out of Recovery 09:34:28         Pain/Sarmad Score: Pain Rating Prior to Med Admin: 5 (1/24/2024  9:15 AM)  Sarmad Score: 10 (1/24/2024 10:10 AM)

## 2024-01-25 ENCOUNTER — OFFICE VISIT (OUTPATIENT)
Dept: ORTHOPEDICS | Facility: CLINIC | Age: 49
End: 2024-01-25
Payer: COMMERCIAL

## 2024-01-25 VITALS — BODY MASS INDEX: 33.93 KG/M2 | HEIGHT: 70 IN | WEIGHT: 237 LBS

## 2024-01-25 DIAGNOSIS — Z98.890 STATUS POST ARTHROSCOPY OF RIGHT KNEE: Primary | ICD-10-CM

## 2024-01-25 PROCEDURE — 1159F MED LIST DOCD IN RCRD: CPT | Mod: CPTII,S$GLB,, | Performed by: ORTHOPAEDIC SURGERY

## 2024-01-25 PROCEDURE — 99024 POSTOP FOLLOW-UP VISIT: CPT | Mod: S$GLB,,, | Performed by: ORTHOPAEDIC SURGERY

## 2024-01-25 PROCEDURE — 1160F RVW MEDS BY RX/DR IN RCRD: CPT | Mod: CPTII,S$GLB,, | Performed by: ORTHOPAEDIC SURGERY

## 2024-01-25 RX ORDER — HYDROMORPHONE HYDROCHLORIDE 2 MG/1
2 TABLET ORAL EVERY 6 HOURS PRN
Qty: 28 TABLET | Refills: 0 | Status: SHIPPED | OUTPATIENT
Start: 2024-01-25 | End: 2024-06-04

## 2024-01-25 NOTE — PROGRESS NOTES
[facility] ORTHOPEDICS POST-OP NOTE    Subjective:           Chief Complaint:   Chief Complaint   Patient presents with    Right Knee - Post-op Evaluation     Patient is here for a PO day 1 f/up Right knee scope 1/24/24, states her pain is somewhat controlled with her pain meds.        Past Medical History:   Diagnosis Date    Acid reflux     Coronary artery disease     Hyperlipemia     Hypertension     Sleep apnea     TMJ (dislocation of temporomandibular joint)        Past Surgical History:   Procedure Laterality Date    CERVICAL DISC SURGERY      CHOLECYSTECTOMY      CORONARY ANGIOPLASTY WITH STENT PLACEMENT      DILATION AND CURETTAGE OF UTERUS      THORACIC DISC SURGERY         Current Outpatient Medications   Medication Sig    aspirin (ECOTRIN) 81 MG EC tablet Take 81 mg by mouth once daily.    clopidogreL (PLAVIX) 75 mg tablet TAKE 1 TABLET BY MOUTH ONCE DAILY FOR 90 DAYS    irbesartan (AVAPRO) 150 MG tablet TAKE 1 TABLET BY MOUTH ONCE DAILY FOR 90 DAYS    meclizine (ANTIVERT) 12.5 mg tablet TK 1 T PO BID PRN    pantoprazole (PROTONIX) 40 MG tablet TAKE 1 TABLET BY MOUTH ONCE DAILY FOR 90 DAYS    rosuvastatin (CRESTOR) 10 MG tablet Take 10 mg by mouth every morning.    sertraline (ZOLOFT) 100 MG tablet Take 100 mg by mouth every morning.    HYDROmorphone (DILAUDID) 2 MG tablet Take 1 tablet (2 mg total) by mouth every 6 (six) hours as needed for Pain.     No current facility-administered medications for this visit.       Review of patient's allergies indicates:   Allergen Reactions    Effexor [venlafaxine]        History reviewed. No pertinent family history.    Social History     Socioeconomic History    Marital status:    Tobacco Use    Smoking status: Former       History of present illness:  Patient comes in today postop day 1 right knee arthroscopy.  Comes in today for wound check, dressing change, and pain control assessment.  She reports her pain is not well controlled.  She had partial medial  "and lateral meniscectomies.  She is found have grade 3 changes on the patella, grade 3-4 changes on the trochlea, grade 2 changes in the lateral compartment, and grade 3-4 changes in the medial compartment.    Review of Systems:    Musculoskeletal:  See HPI      Objective:        Physical Examination:    Vital Signs:  There were no vitals filed for this visit.    Body mass index is 34.01 kg/m².    This a well-developed, well nourished patient in no acute distress.  They are alert and oriented and cooperative to examination.        Right knee exam:  Skin to right knee is clean dry and intact.  There is no erythema or ecchymosis.  There are no signs or symptoms of infection.  Two arthroscopic portals are healing well without wound dehiscence or drainage.  Right calf is soft and nontender.  She can weightbear as tolerated right lower extremity but has the expected antalgic gait.  She is using crutches to assist.    Pertinent New Results:    XRAY Report / Interpretation:   No new radiographs taken on today's clinic visit.    Assessment/Plan:      1. Status post right knee arthroscopy.      Dressing change to right knee today.  She tolerated this well.  We disposed of her postoperative prescription of Percocet and gave her a short course of Dilaudid.  This will be a 1 time prescription.  We will then convert back to Percocet.  We will see her back in about 10 days for wound check and suture removal.    Duncan Adrian, Physician Assistant, served in the capacity as a "scribe" for this patient encounter.  A "face-to-face" encounter occurred with Dr. Néstor Akins on this date.  The treatment plan and medical decision-making is outlined above. Patient was seen and examined with a chaperone.       This note was created using Dragon voice recognition software that occasionally misinterpreted phrases or words.        "

## 2024-02-02 ENCOUNTER — OFFICE VISIT (OUTPATIENT)
Dept: ORTHOPEDICS | Facility: CLINIC | Age: 49
End: 2024-02-02
Payer: COMMERCIAL

## 2024-02-02 VITALS — WEIGHT: 237 LBS | BODY MASS INDEX: 33.93 KG/M2 | HEIGHT: 70 IN

## 2024-02-02 DIAGNOSIS — M17.11 PRIMARY OSTEOARTHRITIS OF RIGHT KNEE: ICD-10-CM

## 2024-02-02 DIAGNOSIS — Z98.890 STATUS POST ARTHROSCOPY OF RIGHT KNEE: Primary | ICD-10-CM

## 2024-02-02 PROCEDURE — 1160F RVW MEDS BY RX/DR IN RCRD: CPT | Mod: CPTII,S$GLB,, | Performed by: PHYSICIAN ASSISTANT

## 2024-02-02 PROCEDURE — 99024 POSTOP FOLLOW-UP VISIT: CPT | Mod: S$GLB,,, | Performed by: PHYSICIAN ASSISTANT

## 2024-02-02 PROCEDURE — 1159F MED LIST DOCD IN RCRD: CPT | Mod: CPTII,S$GLB,, | Performed by: PHYSICIAN ASSISTANT

## 2024-02-02 NOTE — PROGRESS NOTES
[facility] ORTHOPED[facility]  [address]  [place] [address]  Phone: [phone]   [phone]    Patient's PCP:Karthikeyan Biswas NP  Referring Provider: No ref. provider found    POST-OP Note:    Subjective:        Chief Complaint:   Chief Complaint   Patient presents with    Right Knee - Post-op Evaluation     Patient is here for a PO f/up Right knee scope 1.24.24, states her pain is better, pain is a discomfort, still catches off/on       Past Medical History:   Diagnosis Date    Acid reflux     Coronary artery disease     Hyperlipemia     Hypertension     Sleep apnea     TMJ (dislocation of temporomandibular joint)        Past Surgical History:   Procedure Laterality Date    CERVICAL DISC SURGERY      CHOLECYSTECTOMY      CHONDROPLASTY OF KNEE  1/24/2024    Procedure: CHONDROPLASTY, KNEE;  Surgeon: Néstor Akins MD;  Location: [facility] OR;  Service: Orthopedics;;    CORONARY ANGIOPLASTY WITH STENT PLACEMENT      DILATION AND CURETTAGE OF UTERUS      KNEE ARTHROSCOPY W/ MENISCECTOMY Right 1/24/2024    Procedure: ARTHROSCOPY, KNEE, WITH MENISCECTOMY;  Surgeon: Néstor Akins MD;  Location: [facility] OR;  Service: Orthopedics;  Laterality: Right;    THORACIC DISC SURGERY         Current Outpatient Medications   Medication Sig    aspirin (ECOTRIN) 81 MG EC tablet Take 81 mg by mouth once daily.    clopidogreL (PLAVIX) 75 mg tablet TAKE 1 TABLET BY MOUTH ONCE DAILY FOR 90 DAYS    irbesartan (AVAPRO) 150 MG tablet TAKE 1 TABLET BY MOUTH ONCE DAILY FOR 90 DAYS    meclizine (ANTIVERT) 12.5 mg tablet TK 1 T PO BID PRN    pantoprazole (PROTONIX) 40 MG tablet TAKE 1 TABLET BY MOUTH ONCE DAILY FOR 90 DAYS    rosuvastatin (CRESTOR) 10 MG tablet Take 10 mg by mouth every morning.    sertraline (ZOLOFT) 100 MG tablet Take 100 mg by mouth every morning.    HYDROmorphone (DILAUDID) 2 MG tablet Take 1 tablet (2 mg total) by mouth every 6 (six) hours as needed for Pain. (Patient not taking: Reported on 2/2/2024)     No  current facility-administered medications for this visit.       Review of patient's allergies indicates:   Allergen Reactions    Effexor [venlafaxine]        History reviewed. No pertinent family history.    Social History     Socioeconomic History    Marital status:    Tobacco Use    Smoking status: Former       History of present illness:  48-year-old female, returns to clinic today status post arthroscopy of the knee.  This was done January 24th.  Today for routine follow-up.    During arthroscopy patient was found to tricompartmental osteoarthrosis with significant grade 3/4 chondromalacia in the patellofemoral joint and medial compartment.  Grade 2 changes in the lateral compartment.  Patient underwent extensive chondroplasty as well as partial meniscectomies of the medial and lateral meniscus.    Date of Procedure: 1/24/2024      Procedure:  Partial medial and lateral meniscectomies                        Chondroplasty medial femoral condyle                        Chondroplasty patella         Surgeon(s) and Role:     * Néstor Akins MD - Primary     Assisting Surgeon:  Vinicius     Pre-Operative Diagnosis: Degenerative tear of medial meniscus of right knee [M23.203]  Chondromalacia, patella, right [M22.41]     Post-Operative Diagnosis: Post-Op Diagnosis Codes:     * Degenerative tear of medial meniscus of right knee [M23.203]     * Chondromalacia, patella, right [M22.41]     Anesthesia: General     Intraoperative Findings:  The patellofemoral joint was noted to have grade 3 chondromalacia throughout the patella and grade 3 and 4 chondromalacia throughout the trochlea.  The ACL and PCL were intact.  The lateral compartment demonstrated grade 2 chondromalacia throughout with some fraying of the posterior horn and body of the lateral meniscus.  The medial compartment demonstrated grade 3 chondromalacia globally with an area of grade 4 chondromalacia in the central portion of the medial femoral condyle.   There was undersurface fraying of the meniscus with an undersurface flap of the meniscus     Description of the Findings of the Procedure:  Patient was placed on the operative table in supine position.  She was prepped and draped in the usual sterile manner for surgery.  Inferomedial and inferolateral portals were established and diagnostic arthroscopy was undertaken.  The findings of those stated above.  At this point I turned my attention to the patellofemoral joint I used a shaver extensively debrided loose flaps of cartilage from the undersurface of the patella.  Similarly I did debrided large loose fragments of cartilage were which were simply flapping off the trochlea.  I now turned my attention to the lateral compartment I used a shaver and smoothed the edge of the meniscus a proximally 20% of the posterior horn the body.  At this point I turned my attention to the medial meniscus I used a shaver and debrided the undersurface the meniscus of a flap off the posterior horn.  I now used a shaver and debrided very loose hanging flat of cartilage off the condyle especially in the edges of the grade 4 chondromalacia.  At this point I removed the arthroscopic equipment.  The portals were closed with nylon stitches.  Sterile dressings were applied and the patient was noted to be in stable condition      Review of Systems:    Musculoskeletal:  See HPI       Objective:        Physical Examination:    Vital Signs: There were no vitals filed for this visit.    Body mass index is 34.01 kg/m².    This a well-developed, well nourished patient in no acute distress.  They are alert and oriented and cooperative to examination.        Examination of the right knee, skin is dry and intact, no erythema or ecchymosis, no infection, no evidence of dehiscence.  Arthroscopic portals sutures had been come dislodged.  Or patient removed on her own.  She complained of a scant amount of sticky drainage from the lateral arthroscopic  portal.  The lateral port showed some mild evidence of dehiscence.  The medial port however showed no evidence of wound dehiscence.  Range of motion 0°-100.  Calf soft nontender.  Straight leg raise negative.    No evidence of communicating sinus at this point.  We cautioned the patient on signs symptoms of infection, synovial fluid leakage which may suggest a sinus.  To follow-up if she sees any changes.    Pertinent New Results:     XRAY Report / Interpretation:        Assessment:       1. Status post arthroscopy of right knee    2. Primary osteoarthritis of right knee      Plan:     Status post arthroscopy of right knee    Primary osteoarthritis of right knee        Follow up for 1 month f/u - right knee scope 1/24/24.    Stable status post arthroscopy right knee.  Patient was found to multiple areas grade 3 and 4 chondromalacia throughout the knee with tricompartmental osteoarthrosis.  She underwent debridement of the medial meniscus, partial meniscectomy of the lateral meniscus.  Time will tell how she responds to the procedure in terms of improvement in her knee pain.  We will check her back in a month.  Consider intra-articular steroid injection at that time.  Ultimately patient is going to need a total knee arthroplasty almost certainly in her future.  We discussed this in great detail.    Wound Care review: on approximately day 3 after surgery, or 72 hours after your initial surgery date, you may begin cleaning your wounds (AS LONG AS THERE IS NO DRAINAGE PRESENT).     Gentle cleansing with a mild soap and water is recommended. Pat the area dry, and then cover the wound with a clean, sterile dressing.  Do this at least once daily.  Do not apply any ointments creams or lotions to the wounds until told to do so.  Avoid submerging or immersing the wounds in water such as a sink, tub, or pool for at least 1 month after surgery.       JACKIE Garcia, PAVirgilioC    This note was created using MModal voice  recognition software that occasionally misinterprets words or phrases.

## 2024-02-29 ENCOUNTER — OFFICE VISIT (OUTPATIENT)
Dept: ORTHOPEDICS | Facility: CLINIC | Age: 49
End: 2024-02-29
Payer: COMMERCIAL

## 2024-02-29 VITALS — WEIGHT: 237 LBS | HEIGHT: 70 IN | BODY MASS INDEX: 33.93 KG/M2

## 2024-02-29 DIAGNOSIS — Z98.890 STATUS POST ARTHROSCOPY OF RIGHT KNEE: Primary | ICD-10-CM

## 2024-02-29 DIAGNOSIS — M17.11 PRIMARY OSTEOARTHRITIS OF RIGHT KNEE: ICD-10-CM

## 2024-02-29 PROCEDURE — 1159F MED LIST DOCD IN RCRD: CPT | Mod: CPTII,S$GLB,, | Performed by: ORTHOPAEDIC SURGERY

## 2024-02-29 PROCEDURE — 1160F RVW MEDS BY RX/DR IN RCRD: CPT | Mod: CPTII,S$GLB,, | Performed by: ORTHOPAEDIC SURGERY

## 2024-02-29 PROCEDURE — 4010F ACE/ARB THERAPY RXD/TAKEN: CPT | Mod: CPTII,S$GLB,, | Performed by: ORTHOPAEDIC SURGERY

## 2024-02-29 PROCEDURE — 99024 POSTOP FOLLOW-UP VISIT: CPT | Mod: S$GLB,,, | Performed by: ORTHOPAEDIC SURGERY

## 2024-02-29 RX ORDER — TRIAMCINOLONE ACETONIDE 40 MG/ML
40 INJECTION, SUSPENSION INTRA-ARTICULAR; INTRAMUSCULAR
Status: DISCONTINUED | OUTPATIENT
Start: 2024-02-29 | End: 2024-02-29 | Stop reason: HOSPADM

## 2024-02-29 RX ADMIN — TRIAMCINOLONE ACETONIDE 40 MG: 40 INJECTION, SUSPENSION INTRA-ARTICULAR; INTRAMUSCULAR at 09:02

## 2024-02-29 NOTE — PROCEDURES
Large Joint Aspiration/Injection: R knee    Date/Time: 2/29/2024 9:15 AM    Performed by: Néstor Akins MD  Authorized by: Néstor Akins MD    Consent Done?:  Yes (Verbal)  Indications:  Pain  Site marked: the procedure site was marked    Timeout: prior to procedure the correct patient, procedure, and site was verified    Prep: patient was prepped and draped in usual sterile fashion      Local anesthesia used?: Yes    Local anesthetic:  Lidocaine 1% without epinephrine    Details:  Needle Size:  25 G  Ultrasonic Guidance for needle placement?: No    Approach:  Anterolateral  Location:  Knee  Site:  R knee  Medications:  40 mg triamcinolone acetonide 40 mg/mL  Patient tolerance:  Patient tolerated the procedure well with no immediate complications

## 2024-02-29 NOTE — PROGRESS NOTES
[facility]    Subjective:     Chief Complaint:   Chief Complaint   Patient presents with    Right Knee - Post-op Evaluation     PO s/p Right knee scope 1.24.24 aching to throbbing, notes pulling and stretching sensation behind knee and into calf. Increases popping clicking and grinding . Burning sensation to medial knee when touch. Interested in injection       Past Medical History:   Diagnosis Date    Acid reflux     Coronary artery disease     Hyperlipemia     Hypertension     Sleep apnea     TMJ (dislocation of temporomandibular joint)        Past Surgical History:   Procedure Laterality Date    CERVICAL DISC SURGERY      CHOLECYSTECTOMY      CHONDROPLASTY OF KNEE  1/24/2024    Procedure: CHONDROPLASTY, KNEE;  Surgeon: Néstor Akins MD;  Location: [facility];  Service: Orthopedics;;    CORONARY ANGIOPLASTY WITH STENT PLACEMENT      DILATION AND CURETTAGE OF UTERUS      KNEE ARTHROSCOPY W/ MENISCECTOMY Right 1/24/2024    Procedure: ARTHROSCOPY, KNEE, WITH MENISCECTOMY;  Surgeon: Néstor Akins MD;  Location: [facility];  Service: Orthopedics;  Laterality: Right;    THORACIC DISC SURGERY         Current Outpatient Medications   Medication Sig    aspirin (ECOTRIN) 81 MG EC tablet Take 81 mg by mouth once daily.    clopidogreL (PLAVIX) 75 mg tablet TAKE 1 TABLET BY MOUTH ONCE DAILY FOR 90 DAYS    HYDROmorphone (DILAUDID) 2 MG tablet Take 1 tablet (2 mg total) by mouth every 6 (six) hours as needed for Pain. (Patient not taking: Reported on 2/2/2024)    irbesartan (AVAPRO) 150 MG tablet TAKE 1 TABLET BY MOUTH ONCE DAILY FOR 90 DAYS    meclizine (ANTIVERT) 12.5 mg tablet TK 1 T PO BID PRN    pantoprazole (PROTONIX) 40 MG tablet TAKE 1 TABLET BY MOUTH ONCE DAILY FOR 90 DAYS    rosuvastatin (CRESTOR) 10 MG tablet Take 10 mg by mouth every morning.    sertraline (ZOLOFT) 100 MG tablet Take 100 mg by mouth every morning.     No current facility-administered medications for this visit.       Review of patient's  allergies indicates:   Allergen Reactions    Effexor [venlafaxine]        No family history on file.    Social History     Socioeconomic History    Marital status:    Tobacco Use    Smoking status: Former       History of present illness: 48-year-old female, returns to clinic today status post arthroscopy of the knee.  This was done January 24th.  Today for routine follow-up.     During arthroscopy patient was found to tricompartmental osteoarthrosis with significant grade 3/4 chondromalacia in the patellofemoral joint and medial compartment.  Grade 2 changes in the lateral compartment.  Patient underwent extensive chondroplasty as well as partial meniscectomies of the medial and lateral meniscus.     Review of Systems:    Constitution: Negative for chills, fever, and sweats.  Negative for unexplained weight loss.    HENT:  Negative for headaches and blurry vision.    Cardiovascular:Negative for chest pain or irregular heart beat. Negative for hypertension.    Respiratory:  Negative for cough and shortness of breath.    Gastrointestinal: Negative for abdominal pain, heartburn, melena, nausea, and vomitting.    Genitourinary:  Negative bladder incontinence and dysuria.    Musculoskeletal:  See HPI for details.     Neurological: Negative for numbness.    Psychiatric/Behavioral: Negative for depression.  The patient is not nervous/anxious.      Endocrine: Negative for polyuria    Hematologic/Lymphatic: Negative for bleeding problem.  Does not bruise/bleed easily.    Skin: Negative for poor would healing and rash    Objective:      Physical Examination:    Vital Signs:  There were no vitals filed for this visit.    Body mass index is 34.01 kg/m².    This a well-developed, well nourished patient in no acute distress.  They are alert and oriented and cooperative to examination.        Examination of the right knee, skin is dry and intact, no erythema or ecchymosis, no signs symptoms of infection.  No evidence of  "wound failure dehiscence from the arthroscopic portals.  No effusion.  Range of motion 0-110 degrees.  Stable anterior-posterior varus and valgus stress.  Calf soft nontender, straight leg raise negative.    Pertinent New Results:    XRAY Report / Interpretation:       Assessment/Plan:      Stable status post arthroscopy right knee. Patient was found to multiple areas grade 3 and 4 chondromalacia throughout the knee with tricompartmental osteoarthrosis. She underwent debridement of the medial meniscus, partial meniscectomy of the lateral meniscus.  We injected the right knee today with lidocaine and triamcinolone.  Again we will see how she responds to the injection.  Follow-up in 2 months.    Pedro Almendarez, Physician Assistant, served in the capacity as a "scribe" for this patient encounter.  A "face-to-face" encounter occurred with Dr. Néstor Akins on this date.  The treatment plan and medical decision-making is outlined above. Patient was seen and examined with a chaperone.       This note was created using Dragon voice recognition software that occasionally misinterpreted phrases or words.          "

## 2024-03-20 DIAGNOSIS — Z12.31 SCREENING MAMMOGRAM FOR HIGH-RISK PATIENT: Primary | ICD-10-CM

## 2024-04-08 ENCOUNTER — HOSPITAL ENCOUNTER (OUTPATIENT)
Dept: RADIOLOGY | Facility: HOSPITAL | Age: 49
Discharge: HOME | End: 2024-04-08
Attending: SPECIALIST
Payer: COMMERCIAL

## 2024-04-08 VITALS — BODY MASS INDEX: 33.93 KG/M2 | HEIGHT: 70 IN | WEIGHT: 237 LBS

## 2024-04-08 DIAGNOSIS — Z12.31 SCREENING MAMMOGRAM FOR HIGH-RISK PATIENT: ICD-10-CM

## 2024-04-08 PROCEDURE — 77067 SCR MAMMO BI INCL CAD: CPT | Mod: 26,,, | Performed by: RADIOLOGY

## 2024-04-08 PROCEDURE — 77063 BREAST TOMOSYNTHESIS BI: CPT | Mod: TC,PO

## 2024-04-08 PROCEDURE — 77067 SCR MAMMO BI INCL CAD: CPT | Mod: TC,PO

## 2024-04-08 PROCEDURE — 77063 BREAST TOMOSYNTHESIS BI: CPT | Mod: 26,,, | Performed by: RADIOLOGY

## 2024-06-04 ENCOUNTER — OFFICE VISIT (OUTPATIENT)
Dept: ORTHOPEDICS | Facility: CLINIC | Age: 49
End: 2024-06-04
Payer: COMMERCIAL

## 2024-06-04 VITALS — HEIGHT: 70 IN | BODY MASS INDEX: 35.07 KG/M2 | WEIGHT: 245 LBS

## 2024-06-04 DIAGNOSIS — Z98.890 HISTORY OF ARTHROSCOPY OF RIGHT KNEE: Primary | ICD-10-CM

## 2024-06-04 DIAGNOSIS — M17.11 PRIMARY OSTEOARTHRITIS OF RIGHT KNEE: ICD-10-CM

## 2024-06-04 PROCEDURE — 1160F RVW MEDS BY RX/DR IN RCRD: CPT | Mod: CPTII,S$GLB,, | Performed by: ORTHOPAEDIC SURGERY

## 2024-06-04 PROCEDURE — 1159F MED LIST DOCD IN RCRD: CPT | Mod: CPTII,S$GLB,, | Performed by: ORTHOPAEDIC SURGERY

## 2024-06-04 PROCEDURE — 3008F BODY MASS INDEX DOCD: CPT | Mod: CPTII,S$GLB,, | Performed by: ORTHOPAEDIC SURGERY

## 2024-06-04 PROCEDURE — 4010F ACE/ARB THERAPY RXD/TAKEN: CPT | Mod: CPTII,S$GLB,, | Performed by: ORTHOPAEDIC SURGERY

## 2024-06-04 PROCEDURE — 99213 OFFICE O/P EST LOW 20 MIN: CPT | Mod: 25,S$GLB,, | Performed by: ORTHOPAEDIC SURGERY

## 2024-06-04 PROCEDURE — 20610 DRAIN/INJ JOINT/BURSA W/O US: CPT | Mod: RT,S$GLB,, | Performed by: ORTHOPAEDIC SURGERY

## 2024-06-04 RX ORDER — TRIAMCINOLONE ACETONIDE 40 MG/ML
40 INJECTION, SUSPENSION INTRA-ARTICULAR; INTRAMUSCULAR
Status: DISCONTINUED | OUTPATIENT
Start: 2024-06-04 | End: 2024-06-04 | Stop reason: HOSPADM

## 2024-06-04 RX ADMIN — TRIAMCINOLONE ACETONIDE 40 MG: 40 INJECTION, SUSPENSION INTRA-ARTICULAR; INTRAMUSCULAR at 08:06

## 2024-06-04 NOTE — PROGRESS NOTES
[facility]    Subjective:     Chief Complaint:   Chief Complaint   Patient presents with    Knee Pain     Right knee injected, last injected on 2/29/2024, which helped a lot, pain is now back and would like another one today.       Past Medical History:   Diagnosis Date    Acid reflux     Coronary artery disease     Hyperlipemia     Hypertension     Sleep apnea     TMJ (dislocation of temporomandibular joint)        Past Surgical History:   Procedure Laterality Date    CERVICAL DISC SURGERY      CHOLECYSTECTOMY      CHONDROPLASTY OF KNEE  1/24/2024    Procedure: CHONDROPLASTY, KNEE;  Surgeon: Néstor Akins MD;  Location: [facility] OR;  Service: Orthopedics;;    CORONARY ANGIOPLASTY WITH STENT PLACEMENT      DILATION AND CURETTAGE OF UTERUS      KNEE ARTHROSCOPY W/ MENISCECTOMY Right 1/24/2024    Procedure: ARTHROSCOPY, KNEE, WITH MENISCECTOMY;  Surgeon: Néstor Akins MD;  Location: [facility];  Service: Orthopedics;  Laterality: Right;    THORACIC DISC SURGERY         Current Outpatient Medications   Medication Sig    aspirin (ECOTRIN) 81 MG EC tablet Take 81 mg by mouth once daily.    clopidogreL (PLAVIX) 75 mg tablet TAKE 1 TABLET BY MOUTH ONCE DAILY FOR 90 DAYS    irbesartan (AVAPRO) 150 MG tablet TAKE 1 TABLET BY MOUTH ONCE DAILY FOR 90 DAYS    pantoprazole (PROTONIX) 40 MG tablet TAKE 1 TABLET BY MOUTH ONCE DAILY FOR 90 DAYS    rosuvastatin (CRESTOR) 10 MG tablet Take 10 mg by mouth every morning.    sertraline (ZOLOFT) 100 MG tablet Take 100 mg by mouth every morning.     No current facility-administered medications for this visit.       Review of patient's allergies indicates:   Allergen Reactions    Effexor [venlafaxine]        No family history on file.    Social History     Socioeconomic History    Marital status:    Tobacco Use    Smoking status: Former       History of present illness:  Julieth comes in today for follow-up for her right knee.  She has known arthrosis proven on arthroscopy.   She was last here 3 months ago and had an intra-articular injection with great relief of her symptoms.  Unfortunately the pain has returned here recently.  She denies any new injury or trauma.  She comes in today requesting a repeat injection.    Review of Systems:    Constitution: Negative for chills, fever, and sweats.  Negative for unexplained weight loss.    HENT:  Negative for headaches and blurry vision.    Cardiovascular:Negative for chest pain or irregular heart beat. Negative for hypertension.    Respiratory:  Negative for cough and shortness of breath.    Gastrointestinal: Negative for abdominal pain, heartburn, melena, nausea, and vomitting.    Genitourinary:  Negative bladder incontinence and dysuria.    Musculoskeletal:  See HPI for details.     Neurological: Negative for numbness.    Psychiatric/Behavioral: Negative for depression.  The patient is not nervous/anxious.      Endocrine: Negative for polyuria    Hematologic/Lymphatic: Negative for bleeding problem.  Does not bruise/bleed easily.    Skin: Negative for poor would healing and rash    Objective:      Physical Examination:    Vital Signs:  There were no vitals filed for this visit.    Body mass index is 35.15 kg/m².    This a well-developed, well nourished patient in no acute distress.  They are alert and oriented and cooperative to examination.        Right knee exam: Skin to right knee clean dry and intact.  No erythema or ecchymosis.  No signs or symptoms of infection.  She is neurovascularly intact throughout the right lower extremity.  Right calf is soft and nontender.  Right knee range of motion 0-110 degrees.  She is diffusely tender along the medial joint line of the right knee.  She can weightbear as tolerated right lower extremity but has an antalgic gait.      Pertinent New Results:    XRAY Report / Interpretation:   No new radiographs taken on today's clinic visit.    Assessment/Plan:      1. Right knee osteoarthritis.      I  "injected her right knee today via an anterolateral approach with 40 mg of Kenalog and lidocaine.  She tolerated this well.  She is aware that she will ultimately need a right total knee arthroplasty at some point in the future but is just not ready to proceed in that direction.  She does get fairly significant and prolonged relief with injections.  She would like to continue in that regard until they are no longer efficacious.  I believe that is acceptable.  We will see her back in 3 months for repeat injection if she has having a recurrence of symptoms.    Duncan Adrian, Physician Assistant, served in the capacity as a "scribe" for this patient encounter.  A "face-to-face" encounter occurred with Dr. Néstor Akins on this date.  The treatment plan and medical decision-making is outlined above. Patient was seen and examined with a chaperone.       This note was created using Dragon voice recognition software that occasionally misinterpreted phrases or words.          "

## 2024-06-04 NOTE — PROCEDURES
Large Joint Aspiration/Injection: R knee    Date/Time: 6/4/2024 8:15 AM    Performed by: Néstor Akins MD  Authorized by: Néstor Akins MD    Consent Done?:  Yes (Verbal)  Indications:  Pain and arthritis  Site marked: the procedure site was marked    Timeout: prior to procedure the correct patient, procedure, and site was verified    Prep: patient was prepped and draped in usual sterile fashion      Local anesthesia used?: Yes    Local anesthetic:  Lidocaine 1% without epinephrine    Details:  Needle Size:  25 G  Ultrasonic Guidance for needle placement?: No    Approach:  Anterolateral  Location:  Knee  Site:  R knee  Medications:  40 mg triamcinolone acetonide 40 mg/mL  Patient tolerance:  Patient tolerated the procedure well with no immediate complications

## 2024-07-02 ENCOUNTER — PATIENT MESSAGE (OUTPATIENT)
Dept: ORTHOPEDICS | Facility: CLINIC | Age: 49
End: 2024-07-02
Payer: COMMERCIAL

## 2024-07-02 ENCOUNTER — OFFICE VISIT (OUTPATIENT)
Dept: ORTHOPEDICS | Facility: CLINIC | Age: 49
End: 2024-07-02
Payer: COMMERCIAL

## 2024-07-02 VITALS
DIASTOLIC BLOOD PRESSURE: 86 MMHG | SYSTOLIC BLOOD PRESSURE: 136 MMHG | HEIGHT: 70 IN | BODY MASS INDEX: 35.07 KG/M2 | WEIGHT: 245 LBS

## 2024-07-02 DIAGNOSIS — M17.11 PRIMARY OSTEOARTHRITIS OF RIGHT KNEE: Primary | ICD-10-CM

## 2024-07-02 DIAGNOSIS — Z79.01 CURRENT USE OF ANTICOAGULANT THERAPY: ICD-10-CM

## 2024-07-02 DIAGNOSIS — Z01.818 PRE-OP TESTING: ICD-10-CM

## 2024-07-02 PROCEDURE — 1160F RVW MEDS BY RX/DR IN RCRD: CPT | Mod: CPTII,S$GLB,, | Performed by: ORTHOPAEDIC SURGERY

## 2024-07-02 PROCEDURE — 4010F ACE/ARB THERAPY RXD/TAKEN: CPT | Mod: CPTII,S$GLB,, | Performed by: ORTHOPAEDIC SURGERY

## 2024-07-02 PROCEDURE — 99213 OFFICE O/P EST LOW 20 MIN: CPT | Mod: S$GLB,,, | Performed by: ORTHOPAEDIC SURGERY

## 2024-07-02 PROCEDURE — 99999 PR PBB SHADOW E&M-EST. PATIENT-LVL III: CPT | Mod: PBBFAC,,, | Performed by: ORTHOPAEDIC SURGERY

## 2024-07-02 PROCEDURE — 1159F MED LIST DOCD IN RCRD: CPT | Mod: CPTII,S$GLB,, | Performed by: ORTHOPAEDIC SURGERY

## 2024-07-02 PROCEDURE — 3075F SYST BP GE 130 - 139MM HG: CPT | Mod: CPTII,S$GLB,, | Performed by: ORTHOPAEDIC SURGERY

## 2024-07-02 PROCEDURE — 3008F BODY MASS INDEX DOCD: CPT | Mod: CPTII,S$GLB,, | Performed by: ORTHOPAEDIC SURGERY

## 2024-07-02 PROCEDURE — 3079F DIAST BP 80-89 MM HG: CPT | Mod: CPTII,S$GLB,, | Performed by: ORTHOPAEDIC SURGERY

## 2024-07-02 RX ORDER — CEFAZOLIN SODIUM 2 G/50ML
2 SOLUTION INTRAVENOUS
OUTPATIENT
Start: 2024-07-02

## 2024-07-02 RX ORDER — ROSUVASTATIN CALCIUM 20 MG/1
20 TABLET, COATED ORAL EVERY MORNING
COMMUNITY
Start: 2024-06-17

## 2024-07-02 NOTE — PROGRESS NOTES
[facility]    Subjective:     Chief Complaint:   Chief Complaint   Patient presents with    Right Knee - Pain     Patient is here for right knee pain, would like to discuss and schedule surgery. States her pain has gotten worse since last visit, has numbness and well as a burning sensation that radiates throughout the knee. Also has feeling of wanting to give way with popping and grinding        Past Medical History:   Diagnosis Date    Acid reflux     Coronary artery disease     Hyperlipemia     Hypertension     Sleep apnea     TMJ (dislocation of temporomandibular joint)        Past Surgical History:   Procedure Laterality Date    CERVICAL DISC SURGERY      CHOLECYSTECTOMY      CHONDROPLASTY OF KNEE  1/24/2024    Procedure: CHONDROPLASTY, KNEE;  Surgeon: Néstor Akins MD;  Location: [facility];  Service: Orthopedics;;    CORONARY ANGIOPLASTY WITH STENT PLACEMENT      DILATION AND CURETTAGE OF UTERUS      KNEE ARTHROSCOPY W/ MENISCECTOMY Right 1/24/2024    Procedure: ARTHROSCOPY, KNEE, WITH MENISCECTOMY;  Surgeon: Néstor Akins MD;  Location: [facility];  Service: Orthopedics;  Laterality: Right;    THORACIC DISC SURGERY         Current Outpatient Medications   Medication Sig    aspirin (ECOTRIN) 81 MG EC tablet Take 81 mg by mouth once daily.    clopidogreL (PLAVIX) 75 mg tablet TAKE 1 TABLET BY MOUTH ONCE DAILY FOR 90 DAYS    irbesartan (AVAPRO) 150 MG tablet TAKE 1 TABLET BY MOUTH ONCE DAILY FOR 90 DAYS    pantoprazole (PROTONIX) 40 MG tablet TAKE 1 TABLET BY MOUTH ONCE DAILY FOR 90 DAYS    rosuvastatin (CRESTOR) 20 MG tablet Take 20 mg by mouth every morning.    sertraline (ZOLOFT) 100 MG tablet Take 100 mg by mouth every morning.    rosuvastatin (CRESTOR) 10 MG tablet Take 10 mg by mouth every morning. (Patient not taking: Reported on 7/2/2024)     No current facility-administered medications for this visit.       Review of patient's allergies indicates:   Allergen Reactions    Effexor [venlafaxine]         No family history on file.    Social History     Socioeconomic History    Marital status:    Tobacco Use    Smoking status: Former       History of present illness:  Patient comes in today again for the right knee.  The right knee is giving out.  She is having constant severe discomfort.  She has been through an arthroscopy and debridement, also she has had injections.  The last injection only worked for a week.  She is miserable wants to have her knee replaced.  She is having difficulties with activities of daily living.  She has done a strengthening program and nonsteroidals.      Review of Systems:    Constitution: Negative for chills, fever, and sweats.  Negative for unexplained weight loss.    HENT:  Negative for headaches and blurry vision.    Cardiovascular:Negative for chest pain or irregular heart beat. Negative for hypertension.    Respiratory:  Negative for cough and shortness of breath.    Gastrointestinal: Negative for abdominal pain, heartburn, melena, nausea, and vomitting.    Genitourinary:  Negative bladder incontinence and dysuria.    Musculoskeletal:  See HPI for details.     Neurological: Negative for numbness.    Psychiatric/Behavioral: Negative for depression.  The patient is not nervous/anxious.      Endocrine: Negative for polyuria    Hematologic/Lymphatic: Negative for bleeding problem.  Does not bruise/bleed easily.    Skin: Negative for poor would healing and rash    Objective:      Physical Examination:    Vital Signs:    Vitals:    07/02/24 0917   BP: 136/86       Body mass index is 35.15 kg/m².    This a well-developed, well nourished patient in no acute distress.  They are alert and oriented and cooperative to examination.        Patient appears to be stated age she is range of motion 0-120 degrees she has crepitus.  She has 1+ effusion her knee is stable to varus valgus stresses.  Positive Matt's for pain but not a pop  Pertinent New Results:    XRAY Report /  Interpretation:       Assessment/Plan:      Severe bone-on-bone arthrosis in the patellofemoral joint and medial compartment.  This was proven on arthroscopy with direct visualization.  She has failed conservative means and surgical intervention.  I have offered her a right total knee arthroplasty robotic.  The risks and benefits this procedure discussed with her extraordinary detail she clearly understood and wished to proceed      This note was created using Dragon voice recognition software that occasionally misinterpreted phrases or words.

## 2024-07-29 ENCOUNTER — HOSPITAL ENCOUNTER (OUTPATIENT)
Dept: PREADMISSION TESTING | Facility: HOSPITAL | Age: 49
Discharge: HOME | End: 2024-07-29
Attending: ORTHOPAEDIC SURGERY
Payer: COMMERCIAL

## 2024-07-29 ENCOUNTER — HOSPITAL ENCOUNTER (OUTPATIENT)
Dept: RADIOLOGY | Facility: HOSPITAL | Age: 49
Discharge: HOME | End: 2024-07-29
Attending: ORTHOPAEDIC SURGERY
Payer: COMMERCIAL

## 2024-07-29 DIAGNOSIS — M17.11 PRIMARY OSTEOARTHRITIS OF RIGHT KNEE: ICD-10-CM

## 2024-07-29 DIAGNOSIS — Z79.01 CURRENT USE OF ANTICOAGULANT THERAPY: ICD-10-CM

## 2024-07-29 DIAGNOSIS — Z01.818 PRE-OP TESTING: ICD-10-CM

## 2024-07-29 LAB
ANION GAP SERPL CALC-SCNC: 11 MMOL/L (ref 8–16)
APTT PPP: 28.5 SEC (ref 21–32)
BASOPHILS # BLD AUTO: 0.04 K/UL (ref 0–0.2)
BASOPHILS NFR BLD: 0.9 % (ref 0–1.9)
BUN SERPL-MCNC: 11 MG/DL (ref 6–20)
CALCIUM SERPL-MCNC: 9.9 MG/DL (ref 8.7–10.5)
CHLORIDE SERPL-SCNC: 107 MMOL/L (ref 95–110)
CO2 SERPL-SCNC: 25 MMOL/L (ref 23–29)
CREAT SERPL-MCNC: 0.9 MG/DL (ref 0.5–1.4)
DIFFERENTIAL METHOD BLD: ABNORMAL
EOSINOPHIL # BLD AUTO: 0.1 K/UL (ref 0–0.5)
EOSINOPHIL NFR BLD: 1.7 % (ref 0–8)
ERYTHROCYTE [DISTWIDTH] IN BLOOD BY AUTOMATED COUNT: 13 % (ref 11.5–14.5)
EST. GFR  (NO RACE VARIABLE): >60 ML/MIN/1.73 M^2
GLUCOSE SERPL-MCNC: 117 MG/DL (ref 70–110)
HCT VFR BLD AUTO: 39.7 % (ref 37–48.5)
HGB BLD-MCNC: 13.4 G/DL (ref 12–16)
IMM GRANULOCYTES # BLD AUTO: 0.01 K/UL (ref 0–0.04)
IMM GRANULOCYTES NFR BLD AUTO: 0.2 % (ref 0–0.5)
INR PPP: 1 (ref 0.8–1.2)
LYMPHOCYTES # BLD AUTO: 1.5 K/UL (ref 1–4.8)
LYMPHOCYTES NFR BLD: 31.7 % (ref 18–48)
MCH RBC QN AUTO: 31.2 PG (ref 27–31)
MCHC RBC AUTO-ENTMCNC: 33.8 G/DL (ref 32–36)
MCV RBC AUTO: 93 FL (ref 82–98)
MONOCYTES # BLD AUTO: 0.5 K/UL (ref 0.3–1)
MONOCYTES NFR BLD: 10.8 % (ref 4–15)
NEUTROPHILS # BLD AUTO: 2.5 K/UL (ref 1.8–7.7)
NEUTROPHILS NFR BLD: 54.7 % (ref 38–73)
NRBC BLD-RTO: 0 /100 WBC
PLATELET # BLD AUTO: 229 K/UL (ref 150–450)
PMV BLD AUTO: 11.8 FL (ref 9.2–12.9)
POTASSIUM SERPL-SCNC: 4.7 MMOL/L (ref 3.5–5.1)
PROTHROMBIN TIME: 10.3 SEC (ref 9–12.5)
RBC # BLD AUTO: 4.29 M/UL (ref 4–5.4)
SODIUM SERPL-SCNC: 143 MMOL/L (ref 136–145)
WBC # BLD AUTO: 4.61 K/UL (ref 3.9–12.7)

## 2024-07-29 PROCEDURE — 71046 X-RAY EXAM CHEST 2 VIEWS: CPT | Mod: TC

## 2024-07-29 PROCEDURE — 85730 THROMBOPLASTIN TIME PARTIAL: CPT | Performed by: ORTHOPAEDIC SURGERY

## 2024-07-29 PROCEDURE — 85610 PROTHROMBIN TIME: CPT | Performed by: ORTHOPAEDIC SURGERY

## 2024-07-29 PROCEDURE — 73700 CT LOWER EXTREMITY W/O DYE: CPT | Mod: TC,RT

## 2024-07-29 PROCEDURE — 73700 CT LOWER EXTREMITY W/O DYE: CPT | Mod: 26,RT,, | Performed by: RADIOLOGY

## 2024-07-29 PROCEDURE — 71046 X-RAY EXAM CHEST 2 VIEWS: CPT | Mod: 26,,, | Performed by: RADIOLOGY

## 2024-07-29 PROCEDURE — 36415 COLL VENOUS BLD VENIPUNCTURE: CPT | Performed by: ORTHOPAEDIC SURGERY

## 2024-07-29 PROCEDURE — 85025 COMPLETE CBC W/AUTO DIFF WBC: CPT | Performed by: ORTHOPAEDIC SURGERY

## 2024-07-29 PROCEDURE — 80048 BASIC METABOLIC PNL TOTAL CA: CPT | Performed by: ORTHOPAEDIC SURGERY

## 2024-07-29 NOTE — PRE ADMISSION SCREENING
JOINT CAMP ASSESSMENT    Name Julieth Lozano   MRN [id number]    Age/Sex 48 y.o. female    Surgeon Dr. Néstor Akins   Joint Camp Date 7/29/2024   Surgery Date 8/12/2024   Procedure Right Knee Arthroplasty   Insurance Payor: [employer] / Plan: BCBS ALL OUT OF STATE / Product Type: PPO /    Care Team Patient Care Team:  Karthikeyan Biswas, NP as PCP - General (Family Medicine)    Pharmacy   [id number] - [place], [place] - [address] AT [facility] [address]  [address]  [place] [address]  Phone: [phone] Fax: [phone]     AM-PAC Score   23   Risk Assessment Score 3     Past Medical History:   Diagnosis Date    Acid reflux     Anticoagulant long-term use     Coronary artery disease     Hyperlipemia     Hypertension     Sleep apnea     TMJ (temporomandibular joint disorder)        Past Surgical History:   Procedure Laterality Date    CERVICAL DISC SURGERY      CHOLECYSTECTOMY      CHONDROPLASTY OF KNEE  1/24/2024    Procedure: CHONDROPLASTY, KNEE;  Surgeon: Néstor Akins MD;  Location: [facility];  Service: Orthopedics;;    CORONARY ANGIOPLASTY WITH STENT PLACEMENT      DILATION AND CURETTAGE OF UTERUS      KNEE ARTHROSCOPY W/ MENISCECTOMY Right 1/24/2024    Procedure: ARTHROSCOPY, KNEE, WITH MENISCECTOMY;  Surgeon: Néstor Akins MD;  Location: [facility];  Service: Orthopedics;  Laterality: Right;    THORACIC DISC SURGERY           Home Enviroment     Living Arrangement: Lives with spouse  Home Environment: 1-story house/ trailer, walk-in shower  Home Safety Concerns: Pets in the home: cats (1).    DISCHARGE CAREGIVER/SUPPORT SYSTEM     Identified post-op caregiver: Patient has spouse / significant other.  Patient's caregiver(s) will be able to provide physical assistance. Patient will have someone to assist overnight.      Caregiver present at pre-op interview:  No      PRE-OPERATIVE FUNCTIONAL STATUS     Employment: Unemployed    Pre-op Functional Status: Patient is  independent with mobility/ambulation, transfers, ADL's, IADL's.    Use of assistive device for ambulation: none  ADL: self care  ADL Limitations: difficulty with walking  Medical Restrictions: Unstable ambulation and Decreased range of motions in extremities    POTENTIAL BARRIERS TO DISCHARGE/POTENTIAL POST-OP COMPLICATIONS     Patient with hx of coronary artery disease with long-term anticoagulation use (Plavix 75 mg & Aspirin 81 mg once daily) HTN, sleep apnea. POSSIBLE SAME DAY DISCHARGE.    DISCHARGE PLAN     Expected LOS of 1 days or less for joint replacement discussed with patient. We also discussed a discharge path of HH for approximately two weeks with a transition to outpatient PT on the third week given no post-op complications.      Patient in agreement with discharge plan: Yes    Discharge to: Discharge home with home health (PT/OT) x2 weeks with transition to outpatient PT     HH:  [facility] ([place]). Patient disclosure form completed and sent to case management for upload to the medical record.      OP PT: [facility] ([place])     Home DME: rolling walker and shower chair    Needed DME at D/C: bedside commode. Patient to purchase BSC from retail prior to surgery.     Rx: Per Dr. Akins at discharge     Meds to Beds: Yes  Patient expected to discharge on  current regimen of Plavix 75 mg & Aspirin 81 mg once daily  for DVT prophylaxis.

## 2024-07-29 NOTE — DISCHARGE INSTRUCTIONS
To confirm, Your doctor has instructed you that surgery is scheduled for: 8/12/24    Please report to [facility], Registration the morning of surgery. You must check-in and receive a wristband before going to your procedure.  [address] [place] [address] [place] [address]    Pre-Op will call the afternoon prior to surgery between 1:00 and 6:00 PM with the final arrival time.  Phone number: [phone]    PLEASE NOTE:  The surgery schedule has many variables which may affect the time of your surgery case.  Family members should be available if your surgery time changes.  Plan to be here the day of your procedure between 4-6 hours.    MEDICATIONS:  TAKE ONLY THESE MEDICATIONS WITH A SMALL SIP OF WATER THE MORNING OF YOUR PROCEDURE:    SEE MED LIST          DO NOT TAKE THESE MEDICATIONS 5-7 DAYS PRIOR to your procedure or per your surgeon's request:   ASPIRIN, ALEVE, ADVIL, IBUPROFEN, FISH OIL VITAMIN E, HERBALS  (May take Tylenol)    ONLY if you are prescribed any types of blood thinners such as:  Aspirin, Coumadin, Plavix, Pradaxa, Xarelto, Aggrenox, Effient, Eliquis, Savasya, Brilinta, or any other, ask your surgeon whether you should stop taking them and how long before surgery you should stop.  You may also need to verify with the prescribing physician if it is ok to stop your medication.      INSTRUCTIONS IMPORTANT!!  Do not eat or drink anything between midnight and the time of your procedure- this includes gum, mints, and candy.  Do not smoke or drink alcoholic beverages 24 hours prior to your procedure.  Shower the night before AND the morning of your procedure with a Chlorhexidine wash such as Hibiclens or Dial antibacterial soap from the neck down.  Do not get it on your face or in your eyes.  You may use your own shampoo and face wash. This helps your skin to be as bacteria free as possible.    If you wear contact lenses, dentures, hearing aids or glasses, bring a container to put them in  during surgery and give to a family member for safe keeping.  Please leave all jewelry, piercing's and valuables at home. You must remove your false eyelashes prior to surgery.    DO NOT remove hair from the surgery site.  Do not shave the incision site unless you are given specific instructions to do so.    ONLY if you have been diagnosed with sleep apnea please bring your C-PAP machine.  ONLY if you wear home oxygen please bring your portable oxygen tank the day of your procedure.  ONLY if you have a history of OPEN HEART SURGERY you will need a clearance from your Cardiologist per Anesthesia.      ONLY for patients requiring bowel prep, written instructions will be given by your doctor's office.  ONLY if you have a neuro stimulator, please bring the controller with you the morning of surgery  ONLY if a type and screen test is needed before surgery, please return:  If your doctor has scheduled you for an overnight stay, bring a small overnight bag with any personal items you need.  Make arrangements in advance for transportation home by a responsible adult. You can not go home in an uber or a cab per hospital policy.  It is not safe to drive a vehicle during the 24 hours after anesthesia.          All  facilities and properties are tobacco free.  Smoking is NOT allowed.   If you have any questions about these instructions, call Pre-Op Admit  Nursing at [phone] or the Pre-Op Day Surgery Unit at [phone].

## 2024-07-29 NOTE — PRE ADMISSION SCREENING
Patient Name: Julieth Lozano  YOB: 1975   MRN: [id number]     [facility]   Basic Mobility Inpatient Short Form 6 Clicks         How much difficulty does the patient currently have  Unable  A Lot  A Little  None      1. Turning over in bed (including adjusting bedclothes, sheets and blankets)?     1 []    2 []    3 []    4 [x]        2. Sitting down on and standing up from a chair with arms (e.g., wheelchair, bedside commode, etc.)     1 []  2 []  3 [x]     4 []      3. Moving from lying on back to sitting on the side of the bed?     1 []  2 []  3 []    4 [x]    How much help from another person does the patient currently need  Total  A Lot  A Little  None      4. Moving to and from a bed to a chair (including a wheelchair)?    1 []  2 []  3 []    4 [x]      5. Need to walk in hospital room?    1 []  2 []  3 []    4 [x]      6. Climbing 3-5 steps with a railing?    1 []  2 []  3 []    4 [x]       Raw Score:     23             CMS 0-100% Score:   11.20         %   Standardized Score:   56.93            CMS Modifier:       CI                                   [facility]   Basic Mobility Inpatient Short Form 6 Clicks Score Conversion Table*         *Use this form to convert -PAC Basic Mobility Inpatient Raw Scores.   [facility] Inpatient Basic Mobility Short Form Scoring Example   1. Add the number values associated with the response to each item. For example, items totals yield a Raw Score of 21.   2. Match the raw score to the t-Scale scores (t-Scale score = 50.25, SE = 4.69).   3. Find the associated CMS % (CMS % = 28.97%).   4. Locate the correct CMS Functional Modifier Code, or G Code (G code = CJ)     NOTE: Each -PAC Short Form has a separate conversion table. Make sure that you use the correct conversion table.       Instruction Manual - page 45 contains conversion table

## 2024-07-29 NOTE — PRE ADMISSION SCREENING
"               [facility] Risk Assessment Scale    Patient Name: Julieth Lozano  YOB: 1975  MRN: [id number]            RIsk Factor Measure Recommendation Patient Data Scale/Score   BMI >40 Reconsider surgery, weight loss   Estimated body mass index is 35.15 kg/m² as calculated from the following:    Height as of 7/2/24: 5' 10" (1.778 m).    Weight as of 7/2/24: 111.1 kg (245 lb).   [] 0 = 1 - 24.9  [] 1 = 25-29.9  [] 2 = 30-34.9  [x] 3 = 35-39.9  [] 4 = 40-44.9  [] 5 = 45-99.9   Hemoglobin AIC (if applicable) >9 Delay surgery until DM under control  Refer for:  Nutrition Therapy  Exercise   Medication    No results found for: "LABA1C", "HGBA1C"    Lab Results   Component Value Date     (H) 07/29/2024      [] 0 = 4.0-5.6  [] 1 = 5.7-6.4  [] 2 = 6.5-6.9  [] 3 = 7.0-7.9  [] 4 = 8.0-8.9  [] 5 = 9.0-12   Hemoglobin (Anemia) <9 Delay surgery   Correct anemia Lab Results   Component Value Date    HGB 13.4 07/29/2024    [] 20 - <9.0                    Albumin <3 Delay surgery &Workup Lab Results   Component Value Date    ALBUMIN 4.1 01/17/2024    [] 20 - <3.0   Smoking Cessation >4 Weeks Delay Surgery  Refer to OP Cessation Class    Former Smoker [] 20 - current smoker                                _____ PPD                    Hx of MI, PE, Arrhythmia, CVA, DVT <30 Days Delay Surgery    N/A [] 20      Infection Variable Delay surgery and re-evaluate   N/A [] 20 - recent/current infection     Depression (PHQ) >10 out of 27 Delay Surgery and re-evaluate  Medication  Counseling              [x] 0     []1     []2     []3      []4      [] 5                    (1-4)      (5-9)  (10-14)  (15-19)   (20-27)     Memory Impairment & Memory loss (Mini-Cog Screening Tool) Advanced dementia and/or Parkinson's Reconsider surgery     [x] 0     []1     []2     []3     []4     [] 5     Physical Conditioning (Modified AM-PAC Per Physical Therapy at [facility]) Unable to ambulate on day of surgery Delay surgery and " re-evaluate  Pre-Rehabilitation   (PT evaluation)       [x]  0   []4       []8     []12        []16     []20       (<20%)   (<40%)   (<60%)   (<80% )    (>80%)     Home Environment/Caregiver support  (Per /Navigator Interview)    Availability of basic services and/or approprate assistance during post-operative period Delay surgery and re-evaluate  Safe home environment  Health   1 week post-surgery  Transportation  availability  Ability to obtain DME/Medications post-op    [x] 0     []1     []2     []3     []4     [] 5  [x] 0     []1     []2     []3     []4     [] 5  [x] 0     []1     []2     []3     []4     [] 5  [x] 0     []1     []2     []3     []4     [] 5         MD Contact: Dr. Akins Comments:  Total Score:  3

## 2024-07-31 DIAGNOSIS — M17.11 PRIMARY OSTEOARTHRITIS OF RIGHT KNEE: Primary | ICD-10-CM

## 2024-08-07 DIAGNOSIS — M17.11 PRIMARY OSTEOARTHRITIS OF RIGHT KNEE: Primary | ICD-10-CM

## 2024-08-07 RX ORDER — DOCUSATE SODIUM 100 MG/1
100 CAPSULE, LIQUID FILLED ORAL 2 TIMES DAILY
Qty: 60 CAPSULE | Refills: 0 | Status: SHIPPED | OUTPATIENT
Start: 2024-08-07 | End: 2024-09-08

## 2024-08-07 RX ORDER — GABAPENTIN 300 MG/1
300 CAPSULE ORAL 2 TIMES DAILY
Qty: 60 CAPSULE | Refills: 0 | Status: SHIPPED | OUTPATIENT
Start: 2024-08-07 | End: 2024-09-08

## 2024-08-07 RX ORDER — OXYCODONE AND ACETAMINOPHEN 7.5; 325 MG/1; MG/1
1 TABLET ORAL EVERY 6 HOURS PRN
Qty: 28 TABLET | Refills: 0 | Status: SHIPPED | OUTPATIENT
Start: 2024-08-07

## 2024-08-09 ENCOUNTER — ANESTHESIA EVENT (OUTPATIENT)
Dept: SURGERY | Facility: HOSPITAL | Age: 49
End: 2024-08-09
Payer: COMMERCIAL

## 2024-08-12 ENCOUNTER — HOSPITAL ENCOUNTER (OUTPATIENT)
Facility: HOSPITAL | Age: 49
Discharge: HOME | End: 2024-08-12
Attending: ORTHOPAEDIC SURGERY | Admitting: ORTHOPAEDIC SURGERY
Payer: COMMERCIAL

## 2024-08-12 ENCOUNTER — ANESTHESIA (OUTPATIENT)
Dept: SURGERY | Facility: HOSPITAL | Age: 49
End: 2024-08-12
Payer: COMMERCIAL

## 2024-08-12 DIAGNOSIS — Z79.01 CURRENT USE OF ANTICOAGULANT THERAPY: ICD-10-CM

## 2024-08-12 DIAGNOSIS — Z01.818 PRE-OP TESTING: ICD-10-CM

## 2024-08-12 DIAGNOSIS — M17.11 PRIMARY OSTEOARTHRITIS OF RIGHT KNEE: Primary | ICD-10-CM

## 2024-08-12 PROCEDURE — 71000015 HC POSTOP RECOV 1ST HR: Performed by: ORTHOPAEDIC SURGERY

## 2024-08-12 PROCEDURE — 94799 UNLISTED PULMONARY SVC/PX: CPT

## 2024-08-12 PROCEDURE — 25000003 PHARM REV CODE 250: Performed by: ANESTHESIOLOGY

## 2024-08-12 PROCEDURE — 97116 GAIT TRAINING THERAPY: CPT

## 2024-08-12 PROCEDURE — 0055T BONE SRGRY CMPTR CT/MRI IMAG: CPT | Mod: ,,, | Performed by: ORTHOPAEDIC SURGERY

## 2024-08-12 PROCEDURE — 63600175 PHARM REV CODE 636 W HCPCS: Performed by: ORTHOPAEDIC SURGERY

## 2024-08-12 PROCEDURE — 36000712 HC OR TIME LEV V 1ST 15 MIN: Performed by: ORTHOPAEDIC SURGERY

## 2024-08-12 PROCEDURE — 71000039 HC RECOVERY, EACH ADD'L HOUR: Performed by: ORTHOPAEDIC SURGERY

## 2024-08-12 PROCEDURE — 64447 NJX AA&/STRD FEMORAL NRV IMG: CPT | Performed by: ANESTHESIOLOGY

## 2024-08-12 PROCEDURE — C1713 ANCHOR/SCREW BN/BN,TIS/BN: HCPCS | Performed by: ORTHOPAEDIC SURGERY

## 2024-08-12 PROCEDURE — C9290 INJ, BUPIVACAINE LIPOSOME: HCPCS | Performed by: ANESTHESIOLOGY

## 2024-08-12 PROCEDURE — 36000713 HC OR TIME LEV V EA ADD 15 MIN: Performed by: ORTHOPAEDIC SURGERY

## 2024-08-12 PROCEDURE — C1776 JOINT DEVICE (IMPLANTABLE): HCPCS | Performed by: ORTHOPAEDIC SURGERY

## 2024-08-12 PROCEDURE — 25000003 PHARM REV CODE 250: Performed by: ORTHOPAEDIC SURGERY

## 2024-08-12 PROCEDURE — 63600175 PHARM REV CODE 636 W HCPCS

## 2024-08-12 PROCEDURE — 97161 PT EVAL LOW COMPLEX 20 MIN: CPT

## 2024-08-12 PROCEDURE — 37000008 HC ANESTHESIA 1ST 15 MINUTES: Performed by: ORTHOPAEDIC SURGERY

## 2024-08-12 PROCEDURE — 63600175 PHARM REV CODE 636 W HCPCS: Performed by: ANESTHESIOLOGY

## 2024-08-12 PROCEDURE — 27447 TOTAL KNEE ARTHROPLASTY: CPT | Mod: RT,,, | Performed by: ORTHOPAEDIC SURGERY

## 2024-08-12 PROCEDURE — 63600175 PHARM REV CODE 636 W HCPCS: Performed by: STUDENT IN AN ORGANIZED HEALTH CARE EDUCATION/TRAINING PROGRAM

## 2024-08-12 PROCEDURE — 71000033 HC RECOVERY, INTIAL HOUR: Performed by: ORTHOPAEDIC SURGERY

## 2024-08-12 PROCEDURE — 25000003 PHARM REV CODE 250: Performed by: STUDENT IN AN ORGANIZED HEALTH CARE EDUCATION/TRAINING PROGRAM

## 2024-08-12 PROCEDURE — 37000009 HC ANESTHESIA EA ADD 15 MINS: Performed by: ORTHOPAEDIC SURGERY

## 2024-08-12 PROCEDURE — C1769 GUIDE WIRE: HCPCS | Performed by: ORTHOPAEDIC SURGERY

## 2024-08-12 PROCEDURE — 71000016 HC POSTOP RECOV ADDL HR: Performed by: ORTHOPAEDIC SURGERY

## 2024-08-12 PROCEDURE — 97110 THERAPEUTIC EXERCISES: CPT

## 2024-08-12 PROCEDURE — 27201423 OPTIME MED/SURG SUP & DEVICES STERILE SUPPLY: Performed by: ORTHOPAEDIC SURGERY

## 2024-08-12 DEVICE — CRUCIATE RETAINING FEMORAL
Type: IMPLANTABLE DEVICE | Site: KNEE | Status: FUNCTIONAL
Brand: TRIATHLON

## 2024-08-12 DEVICE — SYMMETRIC PATELLA
Type: IMPLANTABLE DEVICE | Site: KNEE | Status: FUNCTIONAL
Brand: TRIATHLON

## 2024-08-12 DEVICE — PRIMARY TIBIAL BASEPLATE
Type: IMPLANTABLE DEVICE | Site: KNEE | Status: FUNCTIONAL
Brand: TRIATHLON

## 2024-08-12 DEVICE — CEMENT BONE SIMPLEX HV RADPQ: Type: IMPLANTABLE DEVICE | Site: KNEE | Status: FUNCTIONAL

## 2024-08-12 RX ORDER — HYDROMORPHONE HYDROCHLORIDE 2 MG/ML
0.2 INJECTION, SOLUTION INTRAMUSCULAR; INTRAVENOUS; SUBCUTANEOUS EVERY 5 MIN PRN
Status: DISCONTINUED | OUTPATIENT
Start: 2024-08-12 | End: 2024-08-12 | Stop reason: HOSPADM

## 2024-08-12 RX ORDER — ACETAMINOPHEN 500 MG
1000 TABLET ORAL
Status: COMPLETED | OUTPATIENT
Start: 2024-08-12 | End: 2024-08-12

## 2024-08-12 RX ORDER — FENTANYL CITRATE 50 UG/ML
25-200 INJECTION, SOLUTION INTRAMUSCULAR; INTRAVENOUS
Status: DISCONTINUED | OUTPATIENT
Start: 2024-08-12 | End: 2024-08-12 | Stop reason: HOSPADM

## 2024-08-12 RX ORDER — CELECOXIB 100 MG/1
200 CAPSULE ORAL ONCE
Status: COMPLETED | OUTPATIENT
Start: 2024-08-12 | End: 2024-08-12

## 2024-08-12 RX ORDER — DIPHENHYDRAMINE HYDROCHLORIDE 50 MG/ML
12.5 INJECTION INTRAMUSCULAR; INTRAVENOUS EVERY 6 HOURS PRN
Status: DISCONTINUED | OUTPATIENT
Start: 2024-08-12 | End: 2024-08-12 | Stop reason: HOSPADM

## 2024-08-12 RX ORDER — SODIUM CHLORIDE, SODIUM LACTATE, POTASSIUM CHLORIDE, CALCIUM CHLORIDE 600; 310; 30; 20 MG/100ML; MG/100ML; MG/100ML; MG/100ML
1000 INJECTION, SOLUTION INTRAVENOUS ONCE
Status: DISCONTINUED | OUTPATIENT
Start: 2024-08-12 | End: 2024-08-12 | Stop reason: HOSPADM

## 2024-08-12 RX ORDER — FENTANYL CITRATE 50 UG/ML
25 INJECTION, SOLUTION INTRAMUSCULAR; INTRAVENOUS EVERY 5 MIN PRN
Status: DISCONTINUED | OUTPATIENT
Start: 2024-08-12 | End: 2024-08-12 | Stop reason: HOSPADM

## 2024-08-12 RX ORDER — SODIUM CHLORIDE 0.9 % (FLUSH) 0.9 %
5 SYRINGE (ML) INJECTION
Status: DISCONTINUED | OUTPATIENT
Start: 2024-08-12 | End: 2024-08-12 | Stop reason: HOSPADM

## 2024-08-12 RX ORDER — OXYCODONE HCL 10 MG/1
10 TABLET, FILM COATED, EXTENDED RELEASE ORAL ONCE
Status: COMPLETED | OUTPATIENT
Start: 2024-08-12 | End: 2024-08-12

## 2024-08-12 RX ORDER — MIDAZOLAM HYDROCHLORIDE 1 MG/ML
.5-4 INJECTION, SOLUTION INTRAMUSCULAR; INTRAVENOUS
Status: DISCONTINUED | OUTPATIENT
Start: 2024-08-12 | End: 2024-08-12 | Stop reason: HOSPADM

## 2024-08-12 RX ORDER — ONDANSETRON HYDROCHLORIDE 2 MG/ML
INJECTION, SOLUTION INTRAVENOUS
Status: DISCONTINUED | OUTPATIENT
Start: 2024-08-12 | End: 2024-08-12

## 2024-08-12 RX ORDER — LIDOCAINE HYDROCHLORIDE 20 MG/ML
INJECTION INTRAVENOUS
Status: DISCONTINUED | OUTPATIENT
Start: 2024-08-12 | End: 2024-08-12

## 2024-08-12 RX ORDER — FENTANYL CITRATE 50 UG/ML
INJECTION, SOLUTION INTRAMUSCULAR; INTRAVENOUS
Status: DISCONTINUED | OUTPATIENT
Start: 2024-08-12 | End: 2024-08-12

## 2024-08-12 RX ORDER — PROPOFOL 10 MG/ML
VIAL (ML) INTRAVENOUS CONTINUOUS PRN
Status: DISCONTINUED | OUTPATIENT
Start: 2024-08-12 | End: 2024-08-12

## 2024-08-12 RX ORDER — DEXTROSE MONOHYDRATE AND SODIUM CHLORIDE 5; .45 G/100ML; G/100ML
INJECTION, SOLUTION INTRAVENOUS CONTINUOUS
Status: CANCELLED | OUTPATIENT
Start: 2024-08-12

## 2024-08-12 RX ORDER — BUPIVACAINE HYDROCHLORIDE 5 MG/ML
INJECTION, SOLUTION EPIDURAL; INTRACAUDAL
Status: COMPLETED | OUTPATIENT
Start: 2024-08-12 | End: 2024-08-12

## 2024-08-12 RX ORDER — BUPIVACAINE HYDROCHLORIDE 7.5 MG/ML
INJECTION, SOLUTION EPIDURAL; RETROBULBAR
Status: COMPLETED | OUTPATIENT
Start: 2024-08-12 | End: 2024-08-12

## 2024-08-12 RX ORDER — MEPERIDINE HYDROCHLORIDE 50 MG/ML
12.5 INJECTION INTRAMUSCULAR; INTRAVENOUS; SUBCUTANEOUS ONCE AS NEEDED
Status: DISCONTINUED | OUTPATIENT
Start: 2024-08-12 | End: 2024-08-12 | Stop reason: HOSPADM

## 2024-08-12 RX ORDER — GLUCAGON 1 MG
1 KIT INJECTION
Status: DISCONTINUED | OUTPATIENT
Start: 2024-08-12 | End: 2024-08-12 | Stop reason: HOSPADM

## 2024-08-12 RX ORDER — OXYCODONE HYDROCHLORIDE 10 MG/1
10 TABLET ORAL EVERY 4 HOURS PRN
Status: CANCELLED | OUTPATIENT
Start: 2024-08-12

## 2024-08-12 RX ORDER — FAMOTIDINE 20 MG/1
20 TABLET, FILM COATED ORAL 2 TIMES DAILY
Status: CANCELLED | OUTPATIENT
Start: 2024-08-12

## 2024-08-12 RX ORDER — MIDAZOLAM HYDROCHLORIDE 1 MG/ML
INJECTION, SOLUTION INTRAMUSCULAR; INTRAVENOUS
Status: DISCONTINUED | OUTPATIENT
Start: 2024-08-12 | End: 2024-08-12

## 2024-08-12 RX ORDER — ONDANSETRON HYDROCHLORIDE 2 MG/ML
4 INJECTION, SOLUTION INTRAVENOUS ONCE AS NEEDED
Status: DISCONTINUED | OUTPATIENT
Start: 2024-08-12 | End: 2024-08-12 | Stop reason: HOSPADM

## 2024-08-12 RX ORDER — POLYETHYLENE GLYCOL 3350 17 G/17G
17 POWDER, FOR SOLUTION ORAL DAILY
Status: CANCELLED | OUTPATIENT
Start: 2024-08-12

## 2024-08-12 RX ORDER — OXYCODONE HYDROCHLORIDE 5 MG/1
5 TABLET ORAL
Status: DISCONTINUED | OUTPATIENT
Start: 2024-08-12 | End: 2024-08-12 | Stop reason: HOSPADM

## 2024-08-12 RX ORDER — ZOLPIDEM TARTRATE 5 MG/1
5 TABLET ORAL NIGHTLY PRN
Status: CANCELLED | OUTPATIENT
Start: 2024-08-12

## 2024-08-12 RX ORDER — CELECOXIB 100 MG/1
200 CAPSULE ORAL 2 TIMES DAILY
Status: CANCELLED | OUTPATIENT
Start: 2024-08-12

## 2024-08-12 RX ORDER — KETAMINE HCL IN 0.9 % NACL 50 MG/5 ML
SYRINGE (ML) INTRAVENOUS
Status: DISCONTINUED | OUTPATIENT
Start: 2024-08-12 | End: 2024-08-12

## 2024-08-12 RX ORDER — BISACODYL 10 MG/1
10 SUPPOSITORY RECTAL DAILY
Status: CANCELLED | OUTPATIENT
Start: 2024-08-12

## 2024-08-12 RX ORDER — DEXAMETHASONE SODIUM PHOSPHATE 4 MG/ML
INJECTION, SOLUTION INTRA-ARTICULAR; INTRALESIONAL; INTRAMUSCULAR; INTRAVENOUS; SOFT TISSUE
Status: DISCONTINUED | OUTPATIENT
Start: 2024-08-12 | End: 2024-08-12

## 2024-08-12 RX ORDER — ONDANSETRON 4 MG/1
8 TABLET, ORALLY DISINTEGRATING ORAL EVERY 8 HOURS PRN
Status: CANCELLED | OUTPATIENT
Start: 2024-08-12

## 2024-08-12 RX ORDER — MORPHINE SULFATE 2 MG/ML
2 INJECTION, SOLUTION INTRAMUSCULAR; INTRAVENOUS EVERY 4 HOURS PRN
Status: CANCELLED | OUTPATIENT
Start: 2024-08-12

## 2024-08-12 RX ADMIN — SODIUM CHLORIDE, SODIUM GLUCONATE, SODIUM ACETATE, POTASSIUM CHLORIDE AND MAGNESIUM CHLORIDE: 526; 502; 368; 37; 30 INJECTION, SOLUTION INTRAVENOUS at 06:08

## 2024-08-12 RX ADMIN — SODIUM CHLORIDE, SODIUM GLUCONATE, SODIUM ACETATE, POTASSIUM CHLORIDE AND MAGNESIUM CHLORIDE: 526; 502; 368; 37; 30 INJECTION, SOLUTION INTRAVENOUS at 08:08

## 2024-08-12 RX ADMIN — PROPOFOL 100 MCG/KG/MIN: 10 INJECTION, EMULSION INTRAVENOUS at 07:08

## 2024-08-12 RX ADMIN — BUPIVACAINE HYDROCHLORIDE 10 ML: 5 INJECTION, SOLUTION EPIDURAL; INTRACAUDAL; PERINEURAL at 07:08

## 2024-08-12 RX ADMIN — MIDAZOLAM 2 MG: 1 INJECTION INTRAMUSCULAR; INTRAVENOUS at 07:08

## 2024-08-12 RX ADMIN — FENTANYL CITRATE 100 MCG: 50 INJECTION, SOLUTION INTRAMUSCULAR; INTRAVENOUS at 07:08

## 2024-08-12 RX ADMIN — CELECOXIB 200 MG: 100 CAPSULE ORAL at 05:08

## 2024-08-12 RX ADMIN — ONDANSETRON 4 MG: 2 INJECTION INTRAMUSCULAR; INTRAVENOUS at 07:08

## 2024-08-12 RX ADMIN — BUPIVACAINE HYDROCHLORIDE 1.6 ML: 7.5 INJECTION, SOLUTION EPIDURAL; RETROBULBAR at 07:08

## 2024-08-12 RX ADMIN — BUPIVACAINE 20 ML: 13.3 INJECTION, SUSPENSION, LIPOSOMAL INFILTRATION at 07:08

## 2024-08-12 RX ADMIN — KETOROLAC TROMETHAMINE: 30 INJECTION, SOLUTION INTRAMUSCULAR at 08:08

## 2024-08-12 RX ADMIN — GLYCOPYRROLATE 0.2 MG: 0.2 INJECTION, SOLUTION INTRAMUSCULAR; INTRAVITREAL at 07:08

## 2024-08-12 RX ADMIN — DEXAMETHASONE SODIUM PHOSPHATE 8 MG: 4 INJECTION, SOLUTION INTRA-ARTICULAR; INTRALESIONAL; INTRAMUSCULAR; INTRAVENOUS; SOFT TISSUE at 07:08

## 2024-08-12 RX ADMIN — Medication 20 MG: at 08:08

## 2024-08-12 RX ADMIN — CEFAZOLIN 2 G: 2 INJECTION, POWDER, FOR SOLUTION INTRAMUSCULAR; INTRAVENOUS at 08:08

## 2024-08-12 RX ADMIN — TRANEXAMIC ACID 1000 MG: 100 INJECTION, SOLUTION INTRAVENOUS at 08:08

## 2024-08-12 RX ADMIN — LIDOCAINE HYDROCHLORIDE 40 MG: 20 INJECTION, SOLUTION INTRAVENOUS at 07:08

## 2024-08-12 RX ADMIN — ACETAMINOPHEN 1000 MG: 500 TABLET ORAL at 05:08

## 2024-08-12 RX ADMIN — OXYCODONE HYDROCHLORIDE 10 MG: 10 TABLET, FILM COATED, EXTENDED RELEASE ORAL at 05:08

## 2024-08-12 NOTE — ANESTHESIA POSTPROCEDURE EVALUATION
Anesthesia Post Evaluation    Patient: Julieth Lozano    Procedure(s) Performed: Procedure(s) (LRB):  ROBOTIC ARTHROPLASTY, KNEE, TOTAL (Right)    Final Anesthesia Type: spinal      Patient location during evaluation: PACU  Patient participation: Yes- Able to Participate  Level of consciousness: awake and alert  Post-procedure vital signs: reviewed and stable  Pain management: adequate  Airway patency: patent    PONV status at discharge: No PONV  Anesthetic complications: no      Cardiovascular status: blood pressure returned to baseline  Respiratory status: unassisted and room air  Hydration status: euvolemic  Follow-up not needed.              Vitals Value Taken Time   /68 08/12/24 1130   Temp 36.3 °C (97.3 °F) 08/12/24 1035   Pulse 63 08/12/24 1130   Resp 18 08/12/24 1130   SpO2 99 % 08/12/24 1130         Event Time   Out of Recovery 10:46:00         Pain/Sarmad Score: Pain Rating Prior to Med Admin: 0 (8/12/2024  5:54 AM)  Sarmad Score: 10 (8/12/2024 10:30 AM)  Modified Sarmad Score: 20 (8/12/2024 11:30 AM)

## 2024-08-12 NOTE — TRANSFER OF CARE
"Anesthesia Transfer of Care Note    Patient: Julieth Lozano    Procedure(s) Performed: Procedure(s) (LRB):  ROBOTIC ARTHROPLASTY, KNEE, TOTAL, RIGHT (Right)    Patient location: PACU    Anesthesia Type: spinal    Transport from OR: Transported from OR on room air with adequate spontaneous ventilation    Post pain: adequate analgesia    Post assessment: no apparent anesthetic complications    Post vital signs: stable    Level of consciousness: awake and alert    Nausea/Vomiting: no nausea/vomiting    Complications: none    Transfer of care protocol was followed      Last vitals: Visit Vitals  BP (!) 141/70 (BP Location: Right arm, Patient Position: Lying)   Pulse (!) 56   Temp 36.8 °C (98.2 °F) (Skin)   Resp 16   Ht 5' 10" (1.778 m)   Wt 108.9 kg (240 lb)   SpO2 97%   Breastfeeding No   BMI 34.44 kg/m²     "

## 2024-08-12 NOTE — DISCHARGE INSTRUCTIONS
"Discharge Instructions: After Your Surgery/Procedure  Youve just had surgery. During surgery you were given medicine called anesthesia to keep you relaxed and free of pain. After surgery you may have some pain or nausea. This is common. Here are some tips for feeling better and getting well after surgery.     Stay on schedule with your medication.   Going home  Your doctor or nurse will show you how to take care of yourself when you go home. He or she will also answer your questions. Have an adult family member or friend drive you home.      For your safety we recommend these precaution for the first 24 hours after your procedure:  Do not drive or use heavy equipment.  Do not make important decisions or sign legal papers.  Do not drink alcohol.  Have someone stay with you, if needed. He or she can watch for problems and help keep you safe.  Your concentration, balance, coordination, and judgement may be impaired for many hours after anesthesia.  Use caution when ambulating or standing up.     You may feel weak and "washed out" after anesthesia and surgery.      Subtle residual effects of general anesthesia or sedation with regional / local anesthesia can last more than 24 hours.  Rest for the remainder of the day or longer if your Doctor/Surgeon has advised you to do so.  Although you may feel normal within the first 24 hours, your reflexes and mental ability may be impaired without you realizing it.  You may feel dizzy, lightheaded or sleepy for 24 hours or longer.      Be sure to go to all follow-up visits with your doctor. And rest after your surgery for as long as your doctor tells you to.  Coping with pain  If you have pain after surgery, pain medicine will help you feel better. Take it as told, before pain becomes severe. Also, ask your doctor or pharmacist about other ways to control pain. This might be with heat, ice, or relaxation. And follow any other instructions your surgeon or nurse gives you.  Tips " for taking pain medicine  To get the best relief possible, remember these points:  Pain medicines can upset your stomach. Taking them with a little food may help.  Most pain relievers taken by mouth need at least 20 to 30 minutes to start to work.  Taking medicine on a schedule can help you remember to take it. Try to time your medicine so that you can take it before starting an activity. This might be before you get dressed, go for a walk, or sit down for dinner.  Constipation is a common side effect of pain medicines. Call your doctor before taking any medicines such as laxatives or stool softeners to help ease constipation. Also ask if you should skip any foods. Drinking lots of fluids and eating foods such as fruits and vegetables that are high in fiber can also help. Remember, do not take laxatives unless your surgeon has prescribed them.  Drinking alcohol and taking pain medicine can cause dizziness and slow your breathing. It can even be deadly. Do not drink alcohol while taking pain medicine.  Pain medicine can make you react more slowly to things. Do not drive or run machinery while taking pain medicine.  Your health care provider may tell you to take acetaminophen to help ease your pain. Ask him or her how much you are supposed to take each day. Acetaminophen or other pain relievers may interact with your prescription medicines or other over-the-counter (OTC) drugs. Some prescription medicines have acetaminophen and other ingredients. Using both prescription and OTC acetaminophen for pain can cause you to overdose. Read the labels on your OTC medicines with care. This will help you to clearly know the list of ingredients, how much to take, and any warnings. It may also help you not take too much acetaminophen. If you have questions or do not understand the information, ask your pharmacist or health care provider to explain it to you before you take the OTC medicine.  Managing nausea  Some people have an  upset stomach after surgery. This is often because of anesthesia, pain, or pain medicine, or the stress of surgery. These tips will help you handle nausea and eat healthy foods as you get better. If you were on a special food plan before surgery, ask your doctor if you should follow it while you get better. These tips may help:  Do not push yourself to eat. Your body will tell you when to eat and how much.  Start off with clear liquids and soup. They are easier to digest.  Next try semi-solid foods, such as mashed potatoes, applesauce, and gelatin, as you feel ready.  Slowly move to solid foods. Dont eat fatty, rich, or spicy foods at first.  Do not force yourself to have 3 large meals a day. Instead eat smaller amounts more often.  Take pain medicines with a small amount of solid food, such as crackers or toast, to avoid nausea.     Call your surgeon if  You still have pain an hour after taking medicine. The medicine may not be strong enough.  You feel too sleepy, dizzy, or groggy. The medicine may be too strong.  You have side effects like nausea, vomiting, or skin changes, such as rash, itching, or hives.       If you have obstructive sleep apnea  You were given anesthesia medicine during surgery to keep you comfortable and free of pain. After surgery, you may have more apnea spells because of this medicine and other medicines you were given. The spells may last longer than usual.   At home:  Keep using the continuous positive airway pressure (CPAP) device when you sleep. Unless your health care provider tells you not to, use it when you sleep, day or night. CPAP is a common device used to treat obstructive sleep apnea.  Talk with your provider before taking any pain medicine, muscle relaxants, or sedatives. Your provider will tell you about the possible dangers of taking these medicines.  © 3501-7781 [employer]. [address] [place] [address], [place] [address]. All rights reserved. This information is  not intended as a substitute for professional medical care. Always follow your healthcare professional's instructions.          Using an Incentive Spirometer    An incentive spirometer is a device that helps you do deep breathing exercises. These exercises expand your lungs, aid in circulation, and help prevent pneumonia. Deep breathing exercises also help you breathe better and improve the function of your lungs by:  Keeping your lungs clear  Strengthening your breathing muscles  Helping prevent respiratory complications or problems  The incentive spirometer gives you a way to take an active part in recover. A nurse or therapist will teach you breathing exercises. To do these exercises, you will breathe in through your mouth and not your nose. The incentive spirometer only works correctly if you breathe in through your mouth.  Steps to clear lungs  Step 1. Exhale normally. Then, inhale normally.  Relax and breathe out.  Step 2. Place your lips tightly around the mouthpiece.  Make sure the device is upright and not tilted.  Step 3. Inhale as much air as you can through the mouthpiece (don't breath through your nose).  Inhale slowly and deeply.  Hold your breath long enough to keep the balls or disk raised for at least 3 to 5 seconds, or as instructed by your healthcare provider.  Some spirometers have an indicator to let you know that you are breathing in too fast. If the indicator goes off, breathe in more slowly.  Step 4. Repeat the exercise regularly.  Do this exercise every hour while you're awake, or as instructed by your healthcare provider.  If you were taught deep breathing and coughing exercises, do them regularly as instructed by your healthcare provider.           Post op instructions for prevention of DVT  What is deep vein thrombosis?  Deep vein thrombosis (DVT) is the medical term for blood clots in the deep veins of the leg.  These blood clots can be dangerous.  A DVT can block a blood vessel and keep  blood from getting where it needs to go.  Another problem is that the clot can travel to other parts of the body such as the lungs.  A clot that travels to the lungs is called a pulmonary embolus (PE) and can cause serious problems with breathing which can lead to death.  Am I at risk for DVT/PE?  If you are not very active, you are at risk of DVT.  Anyone confined to bed, sitting for long periods of time, recovering from surgery, etc. increases the risk of DVT.  Other risk factors are cancer diagnosis, certain medications, estrogen replacement in any form,older age, obesity, pregnancy, smoking, history of clotting disorders, and dehydration.  How will I know if I have a DVT?  Swelling in the lower leg  Pain  Warmth, redness, hardness or bulging of the vein  If you have any of these symptoms, call your doctors office right away.  Some people will not have any symptoms until the clot moves to the lungs.  What are the symptoms of a PE?  Panting, shortness of breath, or trouble breathing  Sharp, knife-like chest pain when you breathe  Coughing or coughing up blood  Rapid heartbeat  If you have any of these symptoms or get worse quickly, call 911 for emergency treatment.  How can I prevent a DVT?  Avoid long periods of inactivity and dont cross your legs--get up and walk around every hour or so.  Stay active--walking after surgery is highly encouraged.  This means you should get out of the house and walk in the neighborhood.  Going up and down stairs will not impair healing (unless advised against such activity by your doctor).    Drink plenty of noncaffeinated, nonalcoholic fluids each day to prevent dehydration.  Wear special support stockings, if they have been advised by your doctor.  If you travel, stop at least once an hour and walk around.  Avoid smoking (assistance with stopping is available through your healthcare provider)  Always notify your doctor if you are not able to follow the post operative  instructions that are given to you at the time of discharge.  It may be necessary to prescribe one of the medications available to prevent DVT.          Exparel(bupivacaine) has been injected to provide approximately 72 hours of reduced pain after your surgery.  Do not remove the bracelet for five days.  Report to your doctor as soon as possible if you experience any of the following:   Restlessness   Anxiety   Speech problems    Lightheadedness   Numbness and tingling of the mouth and lips   Seizures    Metallic taste   Blurred vision   Tremors    Twitching   Depression   Extreme drowsiness  Avoid additional use of local anesthetics (such as dental procedures) for five days (96 hours).          We hope your stay was comfortable as you heal now, mend and rest.    For we have enjoyed taking care of you by giving your our best.    And as you get better, by regaining your health and strength;   We count it as a privilege to have served you and hope your time at [facility] was well spent.      Thank  You!!!

## 2024-08-12 NOTE — H&P
CC/Indication for Procedure: 48 y.o. female with Current use of anticoagulant therapy [Z79.01]  Primary osteoarthritis of right knee [M17.11]  Pre-op testing [Z01.818].    Patient scheduled for DE TOTAL KNEE ARTHROPLASTY [48171] (ROBOTIC ARTHROPLASTY, KNEE, TOTAL, RIGHT)  DE TOTAL KNEE ARTHROPLASTY [66879].    Past Medical History:   Diagnosis Date    Acid reflux     Anticoagulant long-term use     Coronary artery disease     Hyperlipemia     Hypertension     Sleep apnea     TMJ (temporomandibular joint disorder)      Past Surgical History:   Procedure Laterality Date    CERVICAL DISC SURGERY      CHOLECYSTECTOMY      CHONDROPLASTY OF KNEE  1/24/2024    Procedure: CHONDROPLASTY, KNEE;  Surgeon: Néstor Akins MD;  Location: [facility] OR;  Service: Orthopedics;;    CORONARY ANGIOPLASTY WITH STENT PLACEMENT      DILATION AND CURETTAGE OF UTERUS      KNEE ARTHROSCOPY W/ MENISCECTOMY Right 1/24/2024    Procedure: ARTHROSCOPY, KNEE, WITH MENISCECTOMY;  Surgeon: Néstor Akins MD;  Location: [facility] OR;  Service: Orthopedics;  Laterality: Right;    THORACIC DISC SURGERY       No family history on file.  Social History     Socioeconomic History    Marital status:    Tobacco Use    Smoking status: Former     Types: Cigarettes   Substance and Sexual Activity    Alcohol use: Yes     Comment: rarely    Drug use: Never    Sexual activity: Yes     Partners: Male       Review of patient's allergies indicates:   Allergen Reactions    Effexor [venlafaxine] Other (See Comments)     RESTLESS LEGS         Current Facility-Administered Medications:     ceFAZolin 2 g in D5W 50 mL IVPB (MB+), 2 g, Intravenous, On Call Procedure, Néstor Akins MD    electrolyte-S (ISOLYTE), , Intravenous, Continuous, Cousin, Arnulfo ALONSO MD, Last Rate: 50 mL/hr at 08/12/24 0630, New Bag at 08/12/24 0630    fentaNYL 50 mcg/mL injection  mcg,  mcg, Intravenous, PRN, Alfonso Ritchie MD    lactated ringers infusion, 1,000 mL, Intravenous, Once,  Alfonso Ritchie MD    midazolam injection 0.5-4 mg, 0.5-4 mg, Intravenous, PRN, Alfonso Ritchie MD    tranexamic acid (CYKLOKAPRON) 1,000 mg in 0.9% NaCl 100 mL IVPB (MB+), 1,000 mg, Intravenous, On Call Procedure, Néstor Akins MD    ROS:    Denies chest pain or palpitations  Denies shortness of breath  Denies fevers or chills  Denies chest pain  Denies abdominal pain    PE:    General Appearance: Well nourished  Orientation: Oriented to time, place, person  Mental Status: Alert  Heart: RRR  Lungs: CTA  Abdomen: Soft and non-tender    Anesthesia/Surgery risks, benefits and alternative options discussed and understood by patient/family.    This note was created using Dragon voice recognition software that occasionally misinterpreted phrases or words.

## 2024-08-12 NOTE — ANESTHESIA PROCEDURE NOTES
Peripheral Block    Patient location during procedure: pre-op   Block not for primary anesthetic.  Reason for block: at surgeon's request and post-op pain management   Post-op Pain Location: Right knee   Start time: 8/12/2024 7:25 AM  Timeout: 8/12/2024 7:25 AM   End time: 8/12/2024 7:30 AM    Staffing  Authorizing Provider: Ledy Lombardo MD  Performing Provider: Ledy Lombardo MD    Staffing  Performed by: Ledy Lombardo MD  Authorized by: Ledy Lombardo MD    Preanesthetic Checklist  Completed: patient identified, IV checked, site marked, risks and benefits discussed, surgical consent, monitors and equipment checked, pre-op evaluation and timeout performed  Peripheral Block  Patient position: supine  Prep: ChloraPrep  Patient monitoring: heart rate, cardiac monitor, continuous pulse ox, continuous capnometry and frequent blood pressure checks  Block type: adductor canal  Laterality: right  Injection technique: single shot  Needle  Needle type: Stimuplex   Needle gauge: 21 G  Needle length: 4 in  Needle localization: anatomical landmarks and ultrasound guidance   -ultrasound image captured on disc.  Assessment  Injection assessment: negative aspiration, negative parasthesia and local visualized surrounding nerve  Paresthesia pain: none  Heart rate change: no  Slow fractionated injection: yes  Pain Tolerance: comfortable throughout block and no complaints  Medications:    Medications: bupivacaine (pf) (MARCAINE) injection 0.5% - Perineural   10 mL - 8/12/2024 7:30:00 AM    Additional Notes  VSS.  [facility] RN monitoring vitals throughout procedure.  Patient tolerated procedure well.   Exparel 20mL

## 2024-08-12 NOTE — PLAN OF CARE
Pt prepared for surgery. Pt resting in bed, with family/friend at bedside. Family signed up for text messaging. Belongings given to . IS teaching performed. Fall risk agreement reviewed and armband placed. Allergy armband placed. SCDs and teds on.

## 2024-08-12 NOTE — ANESTHESIA PREPROCEDURE EVALUATION
08/12/2024  Julieth Lozano is a 48 y.o., female.      Pre-op Assessment          Review of Systems  Cardiovascular:     Hypertension   CAD                  Coronary Artery Disease:                            Hypertension         Pulmonary:        Sleep Apnea     Obstructive Sleep Apnea (ARLETTE).           Hepatic/GI:     GERD      Gerd              Physical Exam  General: Well nourished        Anesthesia Plan  Type of Anesthesia, risks & benefits discussed:    Anesthesia Type: Spinal  Intra-op Monitoring Plan: Standard ASA Monitors  Post Op Pain Control Plan: multimodal analgesia, IV/PO Opioids PRN and peripheral nerve block  Induction:  IV  Informed Consent: Informed consent signed with the Patient and all parties understand the risks and agree with anesthesia plan.  All questions answered.   ASA Score: 3  Day of Surgery Review of History & Physical: H&P Update referred to the surgeon/provider.    Ready For Surgery From Anesthesia Perspective.     .

## 2024-08-12 NOTE — PT/OT/SLP EVAL
Physical Therapy Evaluation and Discharge Note    Patient Name:  Julieth Lozano   MRN:  [id number]    Recommendations:     Discharge Recommendations: Low Intensity Therapy  Discharge Equipment Recommendations: walker, rolling   Barriers to discharge: None    Assessment:     Julieth Lozano is a 48 y.o. female admitted with a medical diagnosis of right TKA.  At this time, patient is scheduled for discharge home today and appears will be safe with mobility.  Patient would benefit though from continued PT with HHPT to work on ROM and strengthening to further increase safety with mobility.  Patient would benefit from a RW for home mobility.  The mobility limitation cannot be sufficiently resolved by the use of a cane. The patient's functional mobility deficit can be sufficiently resolved with the use of a rolling walker. The patient's mobility limitation significantly impairs their ability to participate in one or more activities of daily living. The use of a rolling walker will significantly improve the patient's ability to participate in mobility-related activities of daily living and the patient will use it on a regular basis in the home.      Recent Surgery: Procedure(s) (LRB):  ROBOTIC ARTHROPLASTY, KNEE, TOTAL, RIGHT (Right) Day of Surgery    Plan:     During this hospitalization, patient does not require further acute PT services.  Please re-consult if situation changes.      Subjective     Chief Complaint: weakness  Patient/Family Comments/goals: go home  Pain/Comfort:  Pain Rating 1: 0/10  Location - Side 1: Right  Location - Orientation 1: lower  Location 1: knee  Pain Addressed 1: Reposition, Cessation of Activity  Pain Rating Post-Intervention 1: 2/10    Patients cultural, spiritual, Taoist conflicts given the current situation:      Living Environment:  Currently lives with spouse in [address] [place] home with 1 step entry.  Prior to admission, patients level of function was independent.  Equipment used at home: none.   DME owned (not currently used): none.  Upon discharge, patient will have assistance from family.    Objective:     Communicated with nurse prior to session.  Patient found supine with cryotherapy upon PT entry to room.    General Precautions: Standard, fall    Orthopedic Precautions:RLE weight bearing as tolerated   Braces:    Respiratory Status: Room air    Exams:  RLE ROM: Deficits: knee flexion 97 degrees taken in sitting  RLE Strength: Deficits: 3-/5 overall  LLE ROM: WFL  LLE Strength: WNL    Functional Mobility:  Bed Mobility:     Supine to Sit: stand by assistance  Transfers:     Sit to Stand:  contact guard assistance with rolling walker  Gait: x 150 feet RW CGA    AM-PAC 6 CLICK MOBILITY  Total Score:20       Treatment and Education:  Exercise to include ankle pumps, quad sets, heel slides, hip abd and LAQ.  All done right LE x 10 reps  Gait training x150 feet, x200 feet RW CGA, x 200 feet rW SBA, up/down 4 inch step RW CGA    AM-PAC 6 CLICK MOBILITY  Total Score:20     Patient left up in chair with call button in reach, nurse notified, and spouse present.    GOALS:   Multidisciplinary Problems       Physical Therapy Goals       Not on file                    History:     Past Medical History:   Diagnosis Date    Acid reflux     Anticoagulant long-term use     Coronary artery disease     Hyperlipemia     Hypertension     Sleep apnea     TMJ (temporomandibular joint disorder)        Past Surgical History:   Procedure Laterality Date    CERVICAL DISC SURGERY      CHOLECYSTECTOMY      CHONDROPLASTY OF KNEE  1/24/2024    Procedure: CHONDROPLASTY, KNEE;  Surgeon: Néstor Akins MD;  Location: [facility] OR;  Service: Orthopedics;;    CORONARY ANGIOPLASTY WITH STENT PLACEMENT      DILATION AND CURETTAGE OF UTERUS      KNEE ARTHROSCOPY W/ MENISCECTOMY Right 1/24/2024    Procedure: ARTHROSCOPY, KNEE, WITH MENISCECTOMY;  Surgeon: Néstor Akins MD;  Location: [facility] OR;  Service: Orthopedics;  Laterality: Right;    THORACIC  DISC SURGERY         Time Tracking:     PT Received On: 08/12/24  PT Start Time: 1122     PT Stop Time: 1202  PT Total Time (min): 40 min     Billable Minutes: Evaluation 20, Gait Training 10, and Therapeutic Exercise 10      08/12/2024

## 2024-08-12 NOTE — ANESTHESIA PROCEDURE NOTES
Spinal    Diagnosis: OA  Patient location during procedure: OR  Start time: 8/12/2024 7:50 AM  Timeout: 8/12/2024 7:50 AM  End time: 8/12/2024 7:55 AM    Staffing  Authorizing Provider: Ledy Lombardo MD  Performing Provider: eLdy Lombardo MD    Staffing  Performed by: Ledy Lombardo MD  Authorized by: Ledy Lombardo MD    Preanesthetic Checklist  Completed: patient identified, IV checked, site marked, risks and benefits discussed, surgical consent, monitors and equipment checked, pre-op evaluation and timeout performed  Spinal Block  Patient position: sitting  Prep: ChloraPrep  Patient monitoring: heart rate, cardiac monitor, continuous pulse ox, continuous capnometry and frequent blood pressure checks  Approach: midline  Location: L2-3  Injection technique: single shot  CSF Fluid: clear free-flowing CSF  Needle  Needle type: Gaby   Needle gauge: 25 G  Needle length: 3.5 in  Additional Documentation: incremental injection, negative aspiration for heme and no paresthesia on injection  Needle localization: anatomical landmarks  Assessment  Sensory level: T10   Dermatomal levels determined by alcohol wipe  Ease of block: easy  Patient's tolerance of the procedure: comfortable throughout block and no complaints  Medications:    Medications: bupivacaine (pf) (MARCAINE) injection 0.75% - Intraspinal   1.6 mL - 8/12/2024 7:55:00 AM

## 2024-08-12 NOTE — PLAN OF CARE
Patient cleared by PT to discharge to home.rolling walker delivered to bedside and sent home with patient. Patient and spouse verbalized readiness to discharge to home.   Patient able to void with no problems noted.  Dressing to right knee remains clean dry and intact.  Medications delivered to bedside and reviewed with patient and spouse.  Discharge instructions given to pt and family/friend, verbalized understanding and questions answered. Handouts provided. Belongings given back to pt. IV removed- catheter intact. Discharge via wheelchair.

## 2024-08-12 NOTE — PLAN OF CARE
[facility] received notification that pt was ready for her RW.      Per Pushpa Garcia with [facility] - okay to pull RW.  [facility] delivered RW to pt at bedside and returned paperwork to the first floor DME closet.

## 2024-08-12 NOTE — OP NOTE
[facility]  Surgery Department  Operative Note    SUMMARY     Date of Procedure: 8/12/2024     Procedure:  Right total knee arthroplasty    Surgeons and Role:     * Néstor Akins MD - Primary    Assisting Surgeon:  Vinicius    Pre-Operative Diagnosis: Current use of anticoagulant therapy [Z79.01]  Primary osteoarthritis of right knee [M17.11]  Pre-op testing [Z01.818]    Post-Operative Diagnosis: Post-Op Diagnosis Codes:     * Current use of anticoagulant therapy [Z79.01]     * Primary osteoarthritis of right knee [M17.11]     * Pre-op testing [Z01.818]    Anesthesia: Spinal    Intraoperative Findings:  Bone-on-bone arthritis right knee primarily the medial compartment and the patellofemoral joint    Description of the Findings of the Procedure: Patient was placed on the operative table in the supine position.  The  knee was prepped and draped in the usual sterile manner for surgery.  An incision was made over the anterior aspect of the knee.  It was carried down sharply through the skin.  The medial parapatellar tissues were divided and the patella was taken laterally.  The effusion was aspirated.  The medial tibial plateau was taken down.  Two pins were placed just medial to the tibial tubercle for the proximal tibial array.  The tibial array was assembled.  We now placed the tibial checkpoint just medial to the tibial tubercle.  The knee was flexed to 90°.  Two pins were placed into the distal femur for the femoral array and the femoral array was assembled.  A femoral checkpoint was placed.  We now connected to the robotic system and determined the center of rotation of the hip.  The medial and lateral malleoli were identified.  The tibial and femoral check points were verified.  We now verified the femur and the tibia.  When this was completed the knee was brought into extension and the extension gap was determined and captured.  Similarly the flexion gap was determined and captured.   A robotic plan was created to balance the flexion-extension gap.  The plan was accepted.  When this was completed the robotic arm was brought into position and the femur and the blade were both verified.  The cutting device was utilized and the femoral cuts were made.  Similarly the tibia was verified as was the tibial cutting blade and the tibial cut was made.  We now placed a femoral trial and the tibial trial.  We had full extension full flexion and completely balanced flexion-extension gaps.  This was determined both clinically and using the robotic measurements.  We now broached the tibia.  The patella was calibrated and cut and a button was placed.  The construct trialed perfectly with no lift-off.  We pulsed and irrigated.  We mixed bone cement and cemented 1st the tibia, next the femur and finally the patella.  All excess cement was removed at the time that we cemented and the construct was held in full extension until all the cement completely hardened.  We copiously irrigated again.  We closed the extensor mechanism with a combination of 2. FiberWire and a running Quill stitch.  We irrigated again and closed the subcu with 2-0 Stratafix.  We closed the skin with 4-0 Monocryl.  Sterile dressings were applied and the patient was noted to be in stable condition pending an x-ray and a neurovascular check.    Complications: No    Estimated Blood Loss (EBL): * No values recorded between 8/12/2024  8:43 AM and 8/12/2024  9:19 AM *           Implants:   Implant Name Type Inv. Item Serial No.  Lot No. LRB No. Used Action   CEMENT BONE SIMPLEX HV RADPQ - [id number]  CEMENT BONE SIMPLEX HV RADPQ  [employer]. [id number] Right 2 Implanted   PIN BONE 3.2J709PM - [id number]  PIN BONE 3.5V185JN  [employer]. [id number] Right 1 Implanted and Explanted   PIN FIXATION BONE 140X3.2MM - [id number]  PIN FIXATION BONE 140X3.2MM  [employer]. [id number] Right 1 Implanted and Explanted   FEMORAL  CEMENTED #4 RIGHT - [id number]  FEMORAL CEMENTED #4 RIGHT  [employer]. [id number] Right 1 Implanted   INSERT TIBIAL CS SIZE 4 9MM - [id number]  INSERT TIBIAL CS SIZE 4 9MM  ELI[employer]. [id number] Right 1 Implanted and Explanted   BASEPLATE TIB ANDREW PRIM SZ 4 - [id number]  BASEPLATE TIB ANDREW PRIM SZ 4  EL[employer]. [id number] Right 1 Implanted   PATELLA TRI 31X9 X3 POLYETHYLE - [id number]  PATELLA TRI 31X9 X3 POLYETHYLE  ELI[employer]. [id number] Right 1 Implanted   INSERT TIBIAL CS SIZE 4 9MM - [id number]  INSERT TIBIAL CS SIZE 4 9MM  ELIUD [employer]. [id number] Right 1 Implanted       Specimens:   Specimen (24h ago, onward)      None                    Condition: Good    Disposition: PACU - hemodynamically stable.              Discharge Note    SUMMARY     Admit Date: 8/12/2024    Discharge Date and Time:  08/12/2024 9:19 AM    Hospital Course (synopsis of major diagnoses, care, treatment, and services provided during the course of the hospital stay): Uneventful      Final Diagnosis: Post-Op Diagnosis Codes:     * Current use of anticoagulant therapy [Z79.01]     * Primary osteoarthritis of right knee [M17.11]     * Pre-op testing [Z01.818]    Disposition: Home or Self Care    Follow Up/Patient Instructions:     Medications:  Reconciled Home Medications:   Current Discharge Medication List        CONTINUE these medications which have NOT CHANGED    Details   irbesartan (AVAPRO) 150 MG tablet TAKE 1 TABLET BY MOUTH ONCE DAILY FOR 90 DAYS    Comments: .      pantoprazole (PROTONIX) 40 MG tablet TAKE 1 TABLET BY MOUTH ONCE DAILY FOR 90 DAYS      rosuvastatin (CRESTOR) 20 MG tablet Take 20 mg by mouth every morning.      sertraline (ZOLOFT) 100 MG tablet Take 100 mg by mouth every morning.      aspirin (ECOTRIN) 81 MG EC tablet Take 81 mg by mouth once daily.      clopidogreL (PLAVIX) 75 mg tablet TAKE 1 TABLET BY MOUTH ONCE DAILY FOR 90 DAYS      docusate sodium (COLACE) 100 MG  capsule Take 1 capsule (100 mg total) by mouth 2 (two) times daily.  Qty: 60 capsule, Refills: 0    Associated Diagnoses: Primary osteoarthritis of right knee      gabapentin (NEURONTIN) 300 MG capsule Take 1 capsule (300 mg total) by mouth 2 (two) times daily.  Qty: 60 capsule, Refills: 0    Associated Diagnoses: Primary osteoarthritis of right knee      oxyCODONE-acetaminophen (PERCOCET) 7.5-325 mg per tablet Take 1 tablet by mouth every 6 (six) hours as needed for Pain.  Qty: 28 tablet, Refills: 0    Comments: THIS RX AND THE ATTACHED 2 OTHER RX'S ARE FOR POST OP USE FOR PATIENT'S SCHEDULED TJA ON MONDAY, AUGUST 12, 2024.  THIS IS FOR THE MEDS TO BED PROGRAM.  Associated Diagnoses: Primary osteoarthritis of right knee           Discharge Procedure Orders   Diet general     Call MD for:  temperature >100.4     Call MD for:  persistent nausea and vomiting     Call MD for:  severe uncontrolled pain     Call MD for:  difficulty breathing, headache or visual disturbances     Call MD for:  redness, tenderness, or signs of infection (pain, swelling, redness, odor or green/yellow discharge around incision site)     Call MD for:  hives     Call MD for:  persistent dizziness or light-headedness     Call MD for:  extreme fatigue      Follow-up Information       [facility]. Call in 1 day(s).    Specialties: Physical Therapy, Home Health Services  Why: As needed  Contact information:  [address] [place] [address] [address]  [address]  [place] [address]  [phone]

## 2024-08-12 NOTE — PLAN OF CARE
Patient received from recovery at this time.  AAOX3.  NAD noted.  Dressing to right knee remains clean, dry and intact. Tolerating po intake well with no complaints of nausea/vomiting.  Patient able to move BLE with no problems noted. Patient able to void with no problems noted.

## 2024-08-13 ENCOUNTER — OFFICE VISIT (OUTPATIENT)
Dept: ORTHOPEDICS | Facility: CLINIC | Age: 49
End: 2024-08-13
Payer: COMMERCIAL

## 2024-08-13 ENCOUNTER — NURSE TRIAGE (OUTPATIENT)
Dept: ADMINISTRATIVE | Facility: CLINIC | Age: 49
End: 2024-08-13
Payer: COMMERCIAL

## 2024-08-13 VITALS — RESPIRATION RATE: 18 BRPM | BODY MASS INDEX: 34.37 KG/M2 | OXYGEN SATURATION: 98 % | WEIGHT: 240.06 LBS | HEIGHT: 70 IN

## 2024-08-13 DIAGNOSIS — Z96.651 STATUS POST TOTAL KNEE REPLACEMENT, RIGHT: Primary | ICD-10-CM

## 2024-08-13 PROCEDURE — 99999 PR PBB SHADOW E&M-EST. PATIENT-LVL III: CPT | Mod: PBBFAC,,, | Performed by: ORTHOPAEDIC SURGERY

## 2024-08-13 PROCEDURE — 99024 POSTOP FOLLOW-UP VISIT: CPT | Mod: S$GLB,,, | Performed by: ORTHOPAEDIC SURGERY

## 2024-08-13 PROCEDURE — 4010F ACE/ARB THERAPY RXD/TAKEN: CPT | Mod: CPTII,S$GLB,, | Performed by: ORTHOPAEDIC SURGERY

## 2024-08-13 PROCEDURE — 1159F MED LIST DOCD IN RCRD: CPT | Mod: CPTII,S$GLB,, | Performed by: ORTHOPAEDIC SURGERY

## 2024-08-13 PROCEDURE — 1160F RVW MEDS BY RX/DR IN RCRD: CPT | Mod: CPTII,S$GLB,, | Performed by: ORTHOPAEDIC SURGERY

## 2024-08-13 NOTE — PROGRESS NOTES
[facility] ORTHOPEDICS POST-OP NOTE    Subjective:           Chief Complaint:   Chief Complaint   Patient presents with    Right Knee - Post-op Evaluation     POD 1: L TKA 8/12/24, doing well, pain is tolerable        Past Medical History:   Diagnosis Date    Acid reflux     Anticoagulant long-term use     Coronary artery disease     Hyperlipemia     Hypertension     Sleep apnea     TMJ (temporomandibular joint disorder)        Past Surgical History:   Procedure Laterality Date    CERVICAL DISC SURGERY      CHOLECYSTECTOMY      CHONDROPLASTY OF KNEE  1/24/2024    Procedure: CHONDROPLASTY, KNEE;  Surgeon: Néstor Akins MD;  Location: [facility] OR;  Service: Orthopedics;;    CORONARY ANGIOPLASTY WITH STENT PLACEMENT      DILATION AND CURETTAGE OF UTERUS      KNEE ARTHROSCOPY W/ MENISCECTOMY Right 1/24/2024    Procedure: ARTHROSCOPY, KNEE, WITH MENISCECTOMY;  Surgeon: Néstor Akins MD;  Location: [facility] OR;  Service: Orthopedics;  Laterality: Right;    THORACIC DISC SURGERY         Current Outpatient Medications   Medication Sig    aspirin (ECOTRIN) 81 MG EC tablet Take 81 mg by mouth once daily.    clopidogreL (PLAVIX) 75 mg tablet TAKE 1 TABLET BY MOUTH ONCE DAILY FOR 90 DAYS    docusate sodium (COLACE) 100 MG capsule Take 1 capsule (100 mg total) by mouth 2 (two) times daily.    gabapentin (NEURONTIN) 300 MG capsule Take 1 capsule (300 mg total) by mouth 2 (two) times daily.    irbesartan (AVAPRO) 150 MG tablet TAKE 1 TABLET BY MOUTH ONCE DAILY FOR 90 DAYS    oxyCODONE-acetaminophen (PERCOCET) 7.5-325 mg per tablet Take 1 tablet by mouth every 6 (six) hours as needed for Pain.    pantoprazole (PROTONIX) 40 MG tablet TAKE 1 TABLET BY MOUTH ONCE DAILY FOR 90 DAYS    rosuvastatin (CRESTOR) 20 MG tablet Take 20 mg by mouth every morning.    sertraline (ZOLOFT) 100 MG tablet Take 100 mg by mouth every morning.     No current facility-administered medications for this visit.       Review of patient's allergies indicates:  "  Allergen Reactions    Effexor [venlafaxine] Other (See Comments)     RESTLESS LEGS       No family history on file.    Social History     Socioeconomic History    Marital status:    Tobacco Use    Smoking status: Former     Types: Cigarettes   Substance and Sexual Activity    Alcohol use: Yes     Comment: rarely    Drug use: Never    Sexual activity: Yes     Partners: Male       History of present illness:  Julieth comes in today postop day 1 right total knee arthroplasty.  Comes in today for wound check, dressing change, pain control assessment.  Her pain is controlled.  She is ambulating with a rolling walker.    Review of Systems:    Musculoskeletal:  See HPI      Objective:        Physical Examination:    Vital Signs:    Vitals:    08/13/24 0808   Resp: 18       Body mass index is 34.45 kg/m².    This a well-developed, well nourished patient in no acute distress.  They are alert and oriented and cooperative to examination.        Right knee exam: Skin to right knee clean dry and intact.  There is no erythema or ecchymosis.  No signs or symptoms of infection.  She is neurovascularly intact throughout the right lower extremity.  Negative Homans on the right.  She can weightbear as tolerated right lower extremity.  She is ambulating with a Rollator.      Pertinent New Results:    XRAY Report / Interpretation:   No new radiographs taken on today's clinic visit.    Assessment/Plan:      1. Status post right total knee arthroplasty.      Dressing change to right knee today.  She tolerated this well.  She will be taking aspirin 81 mg by mouth twice a day for 1 month postop for DVT prophylaxis.  She is scheduled to begin home health PT later today.  She will be following up in 2 weeks for wound check and suture removal.    Duncan Adrian, Physician Assistant, served in the capacity as a "scribe" for this patient encounter.  A "face-to-face" encounter occurred with Dr. Néstor Akins on this date.  The " treatment plan and medical decision-making is outlined above. Patient was seen and examined with a chaperone.     This note was created using Dragon voice recognition software that occasionally misinterpreted phrases or words.

## 2024-08-14 ENCOUNTER — TELEPHONE (OUTPATIENT)
Dept: ORTHOPEDICS | Facility: CLINIC | Age: 49
End: 2024-08-14
Payer: COMMERCIAL

## 2024-08-14 VITALS
RESPIRATION RATE: 18 BRPM | DIASTOLIC BLOOD PRESSURE: 68 MMHG | BODY MASS INDEX: 34.36 KG/M2 | OXYGEN SATURATION: 99 % | TEMPERATURE: 97 F | WEIGHT: 240 LBS | SYSTOLIC BLOOD PRESSURE: 131 MMHG | HEART RATE: 63 BPM | HEIGHT: 70 IN

## 2024-08-14 NOTE — TELEPHONE ENCOUNTER
Pt reports an increase in belching with metallic taste in mouth with belching. Pt reports she had a right knee replacement yesterday. Pt denies abdominal pain, chest pain, abdominal pain, abdominal bloating, throat pain, constipation, N/V, fever, incision symptoms, or SOB. Pt reports right knee pain 5/10, but reports pain is controlled with pain medications. Care advice to call PCP or surgeon within 24 hours. Pt verbalizes understanding.   Reason for Disposition   [1] Caller has NON-URGENT question AND [2] triager unable to answer question    Additional Information   Negative: Sounds like a life-threatening emergency to the triager   Negative: [1] Widespread rash AND [2] bright red, sunburn-like   Negative: [1] SEVERE headache AND [2] after spinal (epidural) anesthesia   Negative: [1] Vomiting AND [2] persists > 4 hours   Negative: [1] Vomiting AND [2] abdomen looks much more swollen than usual   Negative: [1] Drinking very little AND [2] dehydration suspected (e.g., no urine > 12 hours, very dry mouth, very lightheaded)   Negative: Patient sounds very sick or weak to the triager   Negative: Sounds like a serious complication to the triager   Negative: Fever > 100.4 F (38.0 C)   Negative: [1] SEVERE post-op pain (e.g., excruciating, pain scale 8-10) AND [2] not controlled with pain medications   Negative: [1] Caller has URGENT question AND [2] triager unable to answer question   Negative: [1] MILD-MODERATE headache (e.g., pain scale 1-7) AND [2] after spinal (epidural) anesthesia   Negative: Fever present > 3 days (72 hours)   Negative: [1] MILD-MODERATE post-op pain (e.g., pain scale 1-7) AND [2] not controlled with pain medications    Protocols used: Post-Op Symptoms and [id number]

## 2024-08-28 ENCOUNTER — TELEPHONE (OUTPATIENT)
Dept: ORTHOPEDICS | Facility: CLINIC | Age: 49
End: 2024-08-28

## 2024-08-28 ENCOUNTER — OFFICE VISIT (OUTPATIENT)
Dept: ORTHOPEDICS | Facility: CLINIC | Age: 49
End: 2024-08-28
Payer: COMMERCIAL

## 2024-08-28 VITALS — WEIGHT: 236 LBS | BODY MASS INDEX: 33.79 KG/M2 | HEIGHT: 70 IN

## 2024-08-28 DIAGNOSIS — Z96.651 STATUS POST TOTAL KNEE REPLACEMENT, RIGHT: Primary | ICD-10-CM

## 2024-08-28 PROCEDURE — 4010F ACE/ARB THERAPY RXD/TAKEN: CPT | Mod: CPTII,S$GLB,, | Performed by: PHYSICIAN ASSISTANT

## 2024-08-28 PROCEDURE — 99024 POSTOP FOLLOW-UP VISIT: CPT | Mod: S$GLB,,, | Performed by: PHYSICIAN ASSISTANT

## 2024-08-28 PROCEDURE — 99999 PR PBB SHADOW E&M-EST. PATIENT-LVL III: CPT | Mod: PBBFAC,,, | Performed by: PHYSICIAN ASSISTANT

## 2024-08-28 PROCEDURE — 1159F MED LIST DOCD IN RCRD: CPT | Mod: CPTII,S$GLB,, | Performed by: PHYSICIAN ASSISTANT

## 2024-08-28 RX ORDER — OXYCODONE AND ACETAMINOPHEN 7.5; 325 MG/1; MG/1
1 TABLET ORAL EVERY 6 HOURS PRN
Qty: 28 TABLET | Refills: 0 | Status: SHIPPED | OUTPATIENT
Start: 2024-08-28

## 2024-08-28 NOTE — PROGRESS NOTES
[facility] ORTHOPEDICS  [address]  [place] [address]  Phone: [phone]   [phone]    Patient's PCP:Karthikeyan Biswas NP  Referring Provider: No ref. provider found    POST-OP Note:    Subjective:        Chief Complaint:   Chief Complaint   Patient presents with    Right Knee - Post-op Evaluation     Sutures// PO Right TKA 8/12/24, had a bad day 2 days ago, had increased pain, doing home exercises/therapy, pain fluctuates, worse with standing       Past Medical History:   Diagnosis Date    Acid reflux     Anticoagulant long-term use     Coronary artery disease     Hyperlipemia     Hypertension     Sleep apnea     TMJ (temporomandibular joint disorder)        Past Surgical History:   Procedure Laterality Date    CERVICAL DISC SURGERY      CHOLECYSTECTOMY      CHONDROPLASTY OF KNEE  1/24/2024    Procedure: CHONDROPLASTY, KNEE;  Surgeon: Néstor Akins MD;  Location: [facility];  Service: Orthopedics;;    CORONARY ANGIOPLASTY WITH STENT PLACEMENT      DILATION AND CURETTAGE OF UTERUS      KNEE ARTHROSCOPY W/ MENISCECTOMY Right 1/24/2024    Procedure: ARTHROSCOPY, KNEE, WITH MENISCECTOMY;  Surgeon: Néstor Akins MD;  Location: [facility] OR;  Service: Orthopedics;  Laterality: Right;    ROBOTIC ARTHROPLASTY, KNEE Right 8/12/2024    Procedure: ROBOTIC ARTHROPLASTY, KNEE, TOTAL;  Surgeon: Néstor Akins MD;  Location: [facility];  Service: Orthopedics;  Laterality: Right;  Silvio-Herbert    THORACIC DISC SURGERY         Current Outpatient Medications   Medication Sig    aspirin (ECOTRIN) 81 MG EC tablet Take 81 mg by mouth once daily.    clopidogreL (PLAVIX) 75 mg tablet TAKE 1 TABLET BY MOUTH ONCE DAILY FOR 90 DAYS    docusate sodium (COLACE) 100 MG capsule Take 1 capsule (100 mg total) by mouth 2 (two) times daily.    gabapentin (NEURONTIN) 300 MG capsule Take 1 capsule (300 mg total) by mouth 2 (two) times daily.    irbesartan (AVAPRO) 150 MG tablet TAKE 1 TABLET BY MOUTH ONCE DAILY FOR 90 DAYS     pantoprazole (PROTONIX) 40 MG tablet TAKE 1 TABLET BY MOUTH ONCE DAILY FOR 90 DAYS    rosuvastatin (CRESTOR) 20 MG tablet Take 20 mg by mouth every morning.    sertraline (ZOLOFT) 100 MG tablet Take 100 mg by mouth every morning.    oxyCODONE-acetaminophen (PERCOCET) 7.5-325 mg per tablet Take 1 tablet by mouth every 6 (six) hours as needed for Pain.     No current facility-administered medications for this visit.       Review of patient's allergies indicates:   Allergen Reactions    Effexor [venlafaxine] Other (See Comments)     RESTLESS LEGS       No family history on file.    Social History     Socioeconomic History    Marital status:    Tobacco Use    Smoking status: Former     Types: Cigarettes   Substance and Sexual Activity    Alcohol use: Yes     Comment: rarely    Drug use: Never    Sexual activity: Yes     Partners: Male       History of present illness: 49 y.o. female returns to clinic today status post Right knee arthroplasty.  Patient is here today for routine follow-up.    Review of Systems:    Musculoskeletal:  See HPI       Objective:        Physical Examination:    Vital Signs: There were no vitals filed for this visit.    Body mass index is 33.86 kg/m².    This a well-developed, well nourished patient in no acute distress.  They are alert and oriented and cooperative to examination.        Examination of the Right knee, with midline surgical incision consistent with history of stated procedure.  The  skin is dry and intact, no erythema or ecchymosis, no signs symptoms of infection.  She had 2 areas of blister formation.  Bullous lesions that have now ruptured.  No signs and symptoms of infection.  No evidence of wound failure dehiscence.  The remainder of the patient's surgical dressings and sutures were removed today without complication.  Homans sign is negative, straight leg raise is negative.  There are 2+ distal pulses and intact light touch sensation.    Range of motion, she lacks  about 10° of extension, passively I can get her to 0.  Flexion actively 85 passively 90.    Pertinent New Results:       XRAY Report / Interpretation:            Assessment:       1. Status post total knee replacement, right      Plan:     Status post total knee replacement, right  -     Ambulatory referral/consult to Physical/Occupational Therapy; Future; Expected date: 09/04/2024  -     oxyCODONE-acetaminophen (PERCOCET) 7.5-325 mg per tablet; Take 1 tablet by mouth every 6 (six) hours as needed for Pain.  Dispense: 28 tablet; Refill: 0        Follow up for 4 wk f/u, Tues/Thurs with Dr. Akins, X-Ray  PO s/p right TKA 8/12/24.    Stable status post right total knee arthroplasty wounds are healing as expected.  Progressing with range of motion.  We reviewed wound care and medication today.  We will transition to formal physical therapy.  Follow up in 1 month.    Wound Care review: on approximately day 3 after surgery, or 72 hours after your initial surgery date, you may begin cleaning your wounds (AS LONG AS THERE IS NO DRAINAGE PRESENT).     Gentle cleansing with a mild soap and water is recommended. Pat the area dry, and then cover the wound with a clean, sterile dressing.  Do this at least once daily.  Do not apply any ointments creams or lotions to the wounds until told to do so.  Avoid submerging or immersing the wounds in water such as a sink, tub, or pool for at least 1 month after surgery.    At the time of your surgery you were prescribed a combination of various medications which may have included up to six prescriptions:      1. First prescription was for an anticoagulant.  Most commonly aspirin 81 mg to be taken every 12 hours for 30 days postoperatively for DVT prophylaxis.  If you took aspirin prior to the start of this medication you can resume your normal aspirin dosing per your prescribing provider after 30 days.    If you were already on an anticoagulant then continue this medication as directed  daily.    2. Second prescription was for a narcotic pain medication (Percocet 7.5 mg or similar) with instructions to take 1 tablet by mouth every 6 hours as needed for pain. Over the next 3 months, we will continue to taper your medications decreasing both the frequency and strength of the medications over time.    3. Third prescription was for an anti-inflammatory, Celebrex 200 mg with instructions to take 1 tab by mouth as needed every 12 hours for 1 month postoperatively.     4. Fourth prescription is for Neurontin 300 mg with instructions to take 1 tablet by mouth as needed every 12 hours for 1 month postoperatively.    5. Fifth prescription was for Colace 100 mg with instructions to take 1 tablet by mouth as needed every 12 hours for constipation associated with narcotic use.    6.  A Sixth and final prescription may be an antiemetic such as Zofran 4 mg with instructions to take 1 tablet by mouth as needed for postop nausea if you suffer from this.           JACKIE Garcia, PAVirgilioC    This note was created using [employer] voice recognition software that occasionally misinterprets words or phrases.

## 2024-08-28 NOTE — TELEPHONE ENCOUNTER
----- Message from Dai Andrade MA sent at 8/28/2024  3:08 PM CDT -----  Palmer cannot see electronic signature. Please manually fax [phone]

## 2024-09-06 ENCOUNTER — EXTERNAL HOME HEALTH (OUTPATIENT)
Dept: HOME HEALTH SERVICES | Facility: HOSPITAL | Age: 49
End: 2024-09-06
Payer: COMMERCIAL

## 2024-09-26 ENCOUNTER — OFFICE VISIT (OUTPATIENT)
Dept: ORTHOPEDICS | Facility: CLINIC | Age: 49
End: 2024-09-26
Payer: COMMERCIAL

## 2024-09-26 ENCOUNTER — HOSPITAL ENCOUNTER (OUTPATIENT)
Dept: RADIOLOGY | Facility: HOSPITAL | Age: 49
Discharge: HOME | End: 2024-09-26
Attending: PHYSICIAN ASSISTANT
Payer: COMMERCIAL

## 2024-09-26 VITALS — WEIGHT: 235.88 LBS | HEIGHT: 70 IN | BODY MASS INDEX: 33.77 KG/M2

## 2024-09-26 DIAGNOSIS — Z96.651 STATUS POST RIGHT KNEE REPLACEMENT: Primary | ICD-10-CM

## 2024-09-26 PROCEDURE — 73562 X-RAY EXAM OF KNEE 3: CPT | Mod: TC,PN,RT

## 2024-09-26 PROCEDURE — 99999 PR PBB SHADOW E&M-EST. PATIENT-LVL III: CPT | Mod: PBBFAC,,, | Performed by: PHYSICIAN ASSISTANT

## 2024-09-26 PROCEDURE — 73562 X-RAY EXAM OF KNEE 3: CPT | Mod: 26,RT,, | Performed by: RADIOLOGY

## 2024-09-26 RX ORDER — HYDROCODONE BITARTRATE AND ACETAMINOPHEN 7.5; 325 MG/1; MG/1
1 TABLET ORAL EVERY 8 HOURS PRN
Qty: 21 TABLET | Refills: 0 | Status: SHIPPED | OUTPATIENT
Start: 2024-09-26 | End: 2024-10-03

## 2024-09-26 RX ORDER — OXYCODONE AND ACETAMINOPHEN 7.5; 325 MG/1; MG/1
1 TABLET ORAL EVERY 6 HOURS PRN
Qty: 28 TABLET | Refills: 0 | Status: CANCELLED | OUTPATIENT
Start: 2024-09-26

## 2024-09-26 NOTE — PROGRESS NOTES
[facility] ORTHOPEDICS  [address]  [place] [address]  Phone: [phone]   [phone]    Patient's PCP:Karthikeyan Biswas NP  Referring Provider: No ref. provider found    POST-OP Note:    Subjective:        Chief Complaint:   Chief Complaint   Patient presents with    Right Knee - Post-op Evaluation     Follow up for PO Right TKA 8/12/24, states her pain is better than her last visit.        Past Medical History:   Diagnosis Date    Acid reflux     Anticoagulant long-term use     Coronary artery disease     Hyperlipemia     Hypertension     Sleep apnea     TMJ (temporomandibular joint disorder)        Past Surgical History:   Procedure Laterality Date    CERVICAL DISC SURGERY      CHOLECYSTECTOMY      CHONDROPLASTY OF KNEE  1/24/2024    Procedure: CHONDROPLASTY, KNEE;  Surgeon: Néstor Akins MD;  Location: [facility];  Service: Orthopedics;;    CORONARY ANGIOPLASTY WITH STENT PLACEMENT      DILATION AND CURETTAGE OF UTERUS      KNEE ARTHROSCOPY W/ MENISCECTOMY Right 1/24/2024    Procedure: ARTHROSCOPY, KNEE, WITH MENISCECTOMY;  Surgeon: Néstor Akins MD;  Location: [facility] OR;  Service: Orthopedics;  Laterality: Right;    ROBOTIC ARTHROPLASTY, KNEE Right 8/12/2024    Procedure: ROBOTIC ARTHROPLASTY, KNEE, TOTAL;  Surgeon: Néstor Akins MD;  Location: [facility];  Service: Orthopedics;  Laterality: Right;  Silvio-Herbert    THORACIC DISC SURGERY         Current Outpatient Medications   Medication Sig    aspirin (ECOTRIN) 81 MG EC tablet Take 81 mg by mouth once daily.    clopidogreL (PLAVIX) 75 mg tablet TAKE 1 TABLET BY MOUTH ONCE DAILY FOR 90 DAYS    gabapentin (NEURONTIN) 300 MG capsule Take 1 capsule (300 mg total) by mouth 2 (two) times daily.    irbesartan (AVAPRO) 150 MG tablet TAKE 1 TABLET BY MOUTH ONCE DAILY FOR 90 DAYS    pantoprazole (PROTONIX) 40 MG tablet TAKE 1 TABLET BY MOUTH ONCE DAILY FOR 90 DAYS    rosuvastatin (CRESTOR) 20 MG tablet Take 20 mg by mouth every morning.    sertraline  (ZOLOFT) 100 MG tablet Take 100 mg by mouth every morning.    HYDROcodone-acetaminophen (NORCO) 7.5-325 mg per tablet Take 1 tablet by mouth every 8 (eight) hours as needed for Pain.     No current facility-administered medications for this visit.       Review of patient's allergies indicates:   Allergen Reactions    Effexor [venlafaxine] Other (See Comments)     RESTLESS LEGS       No family history on file.    Social History     Socioeconomic History    Marital status:    Tobacco Use    Smoking status: Former     Types: Cigarettes   Substance and Sexual Activity    Alcohol use: Yes     Comment: rarely    Drug use: Never    Sexual activity: Yes     Partners: Male       History of present illness: 49 y.o. female returns to clinic today status post right total knee arthroplasty August 12th.  Here today for routine follow up.      Review of Systems:    Musculoskeletal:  See HPI       Objective:        Physical Examination:    Vital Signs: There were no vitals filed for this visit.    Body mass index is 33.85 kg/m².    This a well-developed, well nourished patient in no acute distress.  They are alert and oriented and cooperative to examination.        Examination of the right knee, skin is dry and intact, no erythema ecchymosis, no signs symptoms of infection.  No evidence of wound failure dehiscence.  Range of motion 0-100 degrees.  Stable to anterior-posterior varus and valgus stress.  Homans signs negative, straight leg raise negative.    Pertinent New Results:     XRAY Report / Interpretation:  AP lateral sunrise views of the right knee taken today in the office demonstrate a right total knee arthroplasty to be in appropriate position without evidence of hardware failure or loosening.       Assessment:       1. Status post right knee replacement      Plan:     Status post right knee replacement  -     X-Ray Knee 3 View Right  -     HYDROcodone-acetaminophen (NORCO) 7.5-325 mg per tablet; Take 1 tablet by  mouth every 8 (eight) hours as needed for Pain.  Dispense: 21 tablet; Refill: 0        Follow up in about 2 months (around 11/26/2024) for R TKA 8/12/24.    Routine follow-up status post right total knee arthroplasty 6 weeks ago.  She is doing well in terms of postoperative range of motion and pain control.  We are going to continue physical therapy although we are going to decrease it to twice a week for a couple of weeks and then she can go down to once a week.  We will see her back in 2 months.  Refilled her pain medication today.    [unfilled]  JACKIE Garcia PA-C      EMR Statement:  Please note that portions of this patient encounter record were imported from the patients electronic medical record and that others were dictated using voice recognition software. For these reasons grammatical errors, nonsensical language, and apparently contradictory statements may be present.  These should be disregarded or interpreted with respect to the context of the document.

## 2024-12-03 ENCOUNTER — OFFICE VISIT (OUTPATIENT)
Dept: ORTHOPEDICS | Facility: CLINIC | Age: 49
End: 2024-12-03
Payer: COMMERCIAL

## 2024-12-03 ENCOUNTER — HOSPITAL ENCOUNTER (OUTPATIENT)
Dept: RADIOLOGY | Facility: HOSPITAL | Age: 49
Discharge: HOME | End: 2024-12-03
Attending: ORTHOPAEDIC SURGERY
Payer: COMMERCIAL

## 2024-12-03 VITALS — HEIGHT: 70 IN | WEIGHT: 235.88 LBS | BODY MASS INDEX: 33.77 KG/M2

## 2024-12-03 DIAGNOSIS — Z96.651 HISTORY OF TOTAL KNEE ARTHROPLASTY, RIGHT: Primary | ICD-10-CM

## 2024-12-03 PROCEDURE — 73562 X-RAY EXAM OF KNEE 3: CPT | Mod: TC,PN,RT

## 2024-12-03 PROCEDURE — 3008F BODY MASS INDEX DOCD: CPT | Mod: CPTII,S$GLB,, | Performed by: ORTHOPAEDIC SURGERY

## 2024-12-03 PROCEDURE — 1159F MED LIST DOCD IN RCRD: CPT | Mod: CPTII,S$GLB,, | Performed by: ORTHOPAEDIC SURGERY

## 2024-12-03 PROCEDURE — 73562 X-RAY EXAM OF KNEE 3: CPT | Mod: 26,RT,, | Performed by: RADIOLOGY

## 2024-12-03 PROCEDURE — 1160F RVW MEDS BY RX/DR IN RCRD: CPT | Mod: CPTII,S$GLB,, | Performed by: ORTHOPAEDIC SURGERY

## 2024-12-03 PROCEDURE — 99999 PR PBB SHADOW E&M-EST. PATIENT-LVL III: CPT | Mod: PBBFAC,,, | Performed by: ORTHOPAEDIC SURGERY

## 2024-12-03 PROCEDURE — 99213 OFFICE O/P EST LOW 20 MIN: CPT | Mod: S$GLB,,, | Performed by: ORTHOPAEDIC SURGERY

## 2024-12-03 PROCEDURE — 4010F ACE/ARB THERAPY RXD/TAKEN: CPT | Mod: CPTII,S$GLB,, | Performed by: ORTHOPAEDIC SURGERY

## 2024-12-03 NOTE — PROGRESS NOTES
[facility] ORTHOPED[facility]    Subjective:     Chief Complaint:   Chief Complaint   Patient presents with    Right Knee - Pain     Follow up for Right TKA 8/12/24, states her pain is better than her last visit but still has some pain       Past Medical History:   Diagnosis Date    Acid reflux     Anticoagulant long-term use     Coronary artery disease     Hyperlipemia     Hypertension     Sleep apnea     TMJ (temporomandibular joint disorder)        Past Surgical History:   Procedure Laterality Date    CERVICAL DISC SURGERY      CHOLECYSTECTOMY      CHONDROPLASTY OF KNEE  1/24/2024    Procedure: CHONDROPLASTY, KNEE;  Surgeon: Néstor Akins MD;  Location: [facility];  Service: Orthopedics;;    CORONARY ANGIOPLASTY WITH STENT PLACEMENT      DILATION AND CURETTAGE OF UTERUS      KNEE ARTHROSCOPY W/ MENISCECTOMY Right 1/24/2024    Procedure: ARTHROSCOPY, KNEE, WITH MENISCECTOMY;  Surgeon: Néstor Akins MD;  Location: [facility] OR;  Service: Orthopedics;  Laterality: Right;    ROBOTIC ARTHROPLASTY, KNEE Right 8/12/2024    Procedure: ROBOTIC ARTHROPLASTY, KNEE, TOTAL;  Surgeon: Néstor Akins MD;  Location: [facility];  Service: Orthopedics;  Laterality: Right;  Silvio-Herbert    THORACIC DISC SURGERY         Current Outpatient Medications   Medication Sig    aspirin (ECOTRIN) 81 MG EC tablet Take 81 mg by mouth once daily.    irbesartan (AVAPRO) 150 MG tablet TAKE 1 TABLET BY MOUTH ONCE DAILY FOR 90 DAYS    pantoprazole (PROTONIX) 40 MG tablet TAKE 1 TABLET BY MOUTH ONCE DAILY FOR 90 DAYS    rosuvastatin (CRESTOR) 20 MG tablet Take 20 mg by mouth every morning.    sertraline (ZOLOFT) 100 MG tablet Take 100 mg by mouth every morning.     No current facility-administered medications for this visit.       Review of patient's allergies indicates:   Allergen Reactions    Effexor [venlafaxine] Other (See Comments)     RESTLESS LEGS       No family history on file.    Social History     Socioeconomic History    Marital status:     Tobacco Use    Smoking status: Former     Types: Cigarettes   Substance and Sexual Activity    Alcohol use: Yes     Comment: rarely    Drug use: Never    Sexual activity: Yes     Partners: Male       History of present illness:  Julieth comes in today for follow-up for her right total knee arthroplasty.  She is just over 3 months postop.  She is doing well.  She has completed her PT.  She still continues with some slight quadriceps weakness.    Review of Systems:    Constitution: Negative for chills, fever, and sweats.  Negative for unexplained weight loss.    HENT:  Negative for headaches and blurry vision.    Cardiovascular:Negative for chest pain or irregular heart beat. Negative for hypertension.    Respiratory:  Negative for cough and shortness of breath.    Gastrointestinal: Negative for abdominal pain, heartburn, melena, nausea, and vomitting.    Genitourinary:  Negative bladder incontinence and dysuria.    Musculoskeletal:  See HPI for details.     Neurological: Negative for numbness.    Psychiatric/Behavioral: Negative for depression.  The patient is not nervous/anxious.      Endocrine: Negative for polyuria    Hematologic/Lymphatic: Negative for bleeding problem.  Does not bruise/bleed easily.    Skin: Negative for poor would healing and rash    Objective:      Physical Examination:    Vital Signs:  There were no vitals filed for this visit.    Body mass index is 33.85 kg/m².    This a well-developed, well nourished patient in no acute distress.  They are alert and oriented and cooperative to examination.        Right knee exam: Skin to right knee clean dry and intact.  No erythema or ecchymosis.  No signs or symptoms of infection.  She is neurovascularly intact throughout the right lower extremity.  Right knee anterior midline incision well healed without wound dehiscence or drainage.  Negative Homans on the right.  Right knee active range of motion 0-120 degrees.  The knee is stable to varus and valgus  "stresses at extension and at 90° of flexion.  She can weightbear as tolerated right lower extremity.  She ambulates with a nonantalgic gait.      Pertinent New Results:    XRAY Report / Interpretation:   Three views taken of the right knee today: AP, lateral, sunrise views.  No acute fractures or dislocations seen.  Visualized soft tissues appear unremarkable.  She has an anatomically placed right total knee arthroplasty without evidence of hardware failure or subsidence.    Assessment/Plan:      1. History of right total knee arthroplasty.      Continue working on quad strengthening.  She can follow up with us on an as-needed basis for any issues or concerns that may arise.    Duncan Adrian, Physician Assistant, served in the capacity as a "scribe" for this patient encounter.  A "face-to-face" encounter occurred with Dr. Néstor Akins on this date.  The treatment plan and medical decision-making is outlined above. Patient was seen and examined with a chaperone.         This note was created using Dragon voice recognition software that occasionally misinterpreted phrases or words.          "

## 2025-05-02 DIAGNOSIS — Z12.31 ENCOUNTER FOR SCREENING MAMMOGRAM FOR MALIGNANT NEOPLASM OF BREAST: Primary | ICD-10-CM

## 2025-05-05 ENCOUNTER — HOSPITAL ENCOUNTER (OUTPATIENT)
Dept: RADIOLOGY | Facility: HOSPITAL | Age: 50
Discharge: HOME | End: 2025-05-05
Attending: SPECIALIST
Payer: COMMERCIAL

## 2025-05-05 DIAGNOSIS — Z12.31 ENCOUNTER FOR SCREENING MAMMOGRAM FOR MALIGNANT NEOPLASM OF BREAST: ICD-10-CM

## 2025-05-05 PROCEDURE — 77067 SCR MAMMO BI INCL CAD: CPT | Mod: TC,PO

## 2025-05-05 PROCEDURE — 77067 SCR MAMMO BI INCL CAD: CPT | Mod: 26,,, | Performed by: RADIOLOGY

## 2025-05-05 PROCEDURE — 77063 BREAST TOMOSYNTHESIS BI: CPT | Mod: 26,,, | Performed by: RADIOLOGY

## (undated) DEVICE — CUTTER MENISCUS AGGRESSIVE 4.0

## (undated) DEVICE — BLADE SAW SGTL 21.2X85.5X1.2

## (undated) DEVICE — STRAP OR TABLE 5IN X 72IN

## (undated) DEVICE — PIN FIXATION BONE 140X3.2MM
Type: IMPLANTABLE DEVICE | Site: KNEE | Status: NON-FUNCTIONAL
Removed: 2024-08-12

## (undated) DEVICE — INTERPULSE SET

## (undated) DEVICE — DRAPE STERI INSTRUMENT 1018

## (undated) DEVICE — PADDING CAST SPECIALIST 6X4YD

## (undated) DEVICE — SLEEVE SCD EXPRESS KNEE MEDIUM

## (undated) DEVICE — SUT STRATAFIX SPIRAL VIOLET

## (undated) DEVICE — BLADE MAKO NARROW

## (undated) DEVICE — GLOVE SENSICARE PI ALOE 8.5

## (undated) DEVICE — PACK SIRUS BASIC V SURG STRL

## (undated) DEVICE — TOWEL OR DISP STRL BLUE 4/PK

## (undated) DEVICE — SET Y-TYPE TUR IRRIGATION 96IN

## (undated) DEVICE — DECANTER FLUID TRNSF WHITE 9IN

## (undated) DEVICE — WRAP DEMAYO LEG STERILE

## (undated) DEVICE — WRAP KNEE ACCU THERM GEL PACK

## (undated) DEVICE — SOL NACL IRR 1000ML BTL

## (undated) DEVICE — TOURNIQUET SB QC DP 34X4IN

## (undated) DEVICE — SYS CLSR DERMABOND PRINEO 42CM

## (undated) DEVICE — DRAPE THREE-QTR REINF 53X77IN

## (undated) DEVICE — DRAPE U SPLIT SHEET 54X76IN

## (undated) DEVICE — SOL NACL IRR 3000ML

## (undated) DEVICE — CATH URETHRAL RED 16FR

## (undated) DEVICE — DRESSING GAUZE OIL EMUL 3X8

## (undated) DEVICE — SPONGE BULKEE II ABSRB 6X6.75

## (undated) DEVICE — SUT FIBERWIRE 2 38 IN TAPER

## (undated) DEVICE — PIN BONE 3.2X110MM
Type: IMPLANTABLE DEVICE | Site: KNEE | Status: NON-FUNCTIONAL
Removed: 2024-08-12

## (undated) DEVICE — SUT STRATAFIX PDS 1 CTX 18IN

## (undated) DEVICE — SUT MONOCRYL PLUS UD 3-0 27

## (undated) DEVICE — BANDAGE ACE DOUBLE STER 6IN

## (undated) DEVICE — GLOVE BIOGEL SKINSENSE PI 8.5

## (undated) DEVICE — PACK ARTHROSCOPY SMH

## (undated) DEVICE — PACK EXTREMITY ORTHOMAX

## (undated) DEVICE — GOWN TOGA SYS PEELWY ZIP 2 XL

## (undated) DEVICE — APPLICATOR CHLORAPREP ORN 26ML

## (undated) DEVICE — TIBIAL BEARING INSERT - CS
Type: IMPLANTABLE DEVICE | Site: KNEE | Status: NON-FUNCTIONAL
Brand: TRIATHLON
Removed: 2024-08-12

## (undated) DEVICE — KIT DRAPE RIO ONE PIECE W/POCK

## (undated) DEVICE — SYS SURGIPHOR STRL IRRG

## (undated) DEVICE — DRAPE INVISISHIELD TOWEL SMALL

## (undated) DEVICE — KIT TRIATHLON CR TIB PREP SZ4

## (undated) DEVICE — COVER TABLE 44X90 STERILE

## (undated) DEVICE — YANKAUER FLEX NO VENT HI CAP

## (undated) DEVICE — ELECTRODE REM PLYHSV RETURN 9

## (undated) DEVICE — BLADE SURG CARBON STEEL #10

## (undated) DEVICE — TOGA FLYTE PEEL AWAY XLARGE

## (undated) DEVICE — KIT TRIATHLON CR FEM PREP SZ4

## (undated) DEVICE — NDL SPINAL 18GX3.5 SPINOCAN

## (undated) DEVICE — DRAPE INCISE IOBAN 2 23X33IN

## (undated) DEVICE — PACK CUSTOM UNIV BASIN SLI

## (undated) DEVICE — BNDG COFLEX FOAM LF2 ST 6X5YD

## (undated) DEVICE — GLOVE SENSICARE PI GRN 8.5

## (undated) DEVICE — GLOVE BIOGEL PIMICRO INDIC 8.5

## (undated) DEVICE — TAPE SILK 3IN

## (undated) DEVICE — ALCOHOL 70% ANTISEPTIC ISO 4OZ

## (undated) DEVICE — MANIFOLD 4 PORT

## (undated) DEVICE — SYR 50CC LL

## (undated) DEVICE — KIT VIZADISC KNEE TRACKING

## (undated) DEVICE — PADDING WYTEX UNDRCST 6INX4YD

## (undated) DEVICE — BANDAGE ESMARK ELASTIC ST 6X9

## (undated) DEVICE — KIT TRIATHLON TIBIAL SIZER

## (undated) DEVICE — MIXER BONE CEMENT